# Patient Record
Sex: MALE | Race: WHITE | NOT HISPANIC OR LATINO | Employment: FULL TIME | URBAN - METROPOLITAN AREA
[De-identification: names, ages, dates, MRNs, and addresses within clinical notes are randomized per-mention and may not be internally consistent; named-entity substitution may affect disease eponyms.]

---

## 2017-02-14 ENCOUNTER — PATIENT MESSAGE (OUTPATIENT)
Dept: RESEARCH | Facility: HOSPITAL | Age: 52
End: 2017-02-14

## 2017-02-15 ENCOUNTER — TELEPHONE (OUTPATIENT)
Dept: FAMILY MEDICINE | Facility: CLINIC | Age: 52
End: 2017-02-15

## 2017-02-15 NOTE — TELEPHONE ENCOUNTER
----- Message from Chu Paulino sent at 2/15/2017  4:42 PM CST -----  Contact: [phone]   Pt would like to be worked in for an appt sooner than 03/01/17.  Please call back @ [phone].  Thanks-[facility]

## 2017-02-21 RX ORDER — ESCITALOPRAM OXALATE 10 MG/1
10 TABLET ORAL DAILY
Qty: 90 TABLET | Refills: 3 | Status: CANCELLED | OUTPATIENT
Start: 2017-02-21 | End: 2018-02-21

## 2017-02-22 ENCOUNTER — OFFICE VISIT (OUTPATIENT)
Dept: FAMILY MEDICINE | Facility: CLINIC | Age: 52
End: 2017-02-22
Payer: COMMERCIAL

## 2017-02-22 ENCOUNTER — LAB VISIT (OUTPATIENT)
Dept: LAB | Facility: HOSPITAL | Age: 52
End: 2017-02-22
Attending: FAMILY MEDICINE
Payer: COMMERCIAL

## 2017-02-22 VITALS
WEIGHT: 254.5 LBS | SYSTOLIC BLOOD PRESSURE: 113 MMHG | HEART RATE: 62 BPM | TEMPERATURE: 99 F | BODY MASS INDEX: 35.63 KG/M2 | HEIGHT: 71 IN | DIASTOLIC BLOOD PRESSURE: 71 MMHG

## 2017-02-22 DIAGNOSIS — E78.5 DYSLIPIDEMIA: Primary | ICD-10-CM

## 2017-02-22 DIAGNOSIS — Z86.010 HISTORY OF COLON POLYPS: ICD-10-CM

## 2017-02-22 DIAGNOSIS — M25.462 PAIN AND SWELLING OF KNEE, LEFT: ICD-10-CM

## 2017-02-22 DIAGNOSIS — M25.562 PAIN AND SWELLING OF KNEE, LEFT: ICD-10-CM

## 2017-02-22 DIAGNOSIS — E78.5 DYSLIPIDEMIA: ICD-10-CM

## 2017-02-22 DIAGNOSIS — F32.A DEPRESSION, UNSPECIFIED DEPRESSION TYPE: ICD-10-CM

## 2017-02-22 DIAGNOSIS — I25.10 CORONARY ARTERY DISEASE, NON-OCCLUSIVE: Chronic | ICD-10-CM

## 2017-02-22 DIAGNOSIS — F41.9 ANXIETY: ICD-10-CM

## 2017-02-22 DIAGNOSIS — Z13.9 SCREENING: ICD-10-CM

## 2017-02-22 DIAGNOSIS — G47.33 OSA ON CPAP: Chronic | ICD-10-CM

## 2017-02-22 DIAGNOSIS — I10 ESSENTIAL HYPERTENSION: ICD-10-CM

## 2017-02-22 DIAGNOSIS — E88.810 METABOLIC SYNDROME: Chronic | ICD-10-CM

## 2017-02-22 DIAGNOSIS — Z80.0 FAMILY HISTORY OF COLON CANCER: ICD-10-CM

## 2017-02-22 DIAGNOSIS — M77.8 LEFT WRIST TENDINITIS: ICD-10-CM

## 2017-02-22 LAB
ALBUMIN SERPL BCP-MCNC: 3.9 G/DL
ALP SERPL-CCNC: 81 U/L
ALT SERPL W/O P-5'-P-CCNC: 36 U/L
ANION GAP SERPL CALC-SCNC: 6 MMOL/L
AST SERPL-CCNC: 24 U/L
BILIRUB SERPL-MCNC: 0.6 MG/DL
BUN SERPL-MCNC: 21 MG/DL
CALCIUM SERPL-MCNC: 9.6 MG/DL
CHLORIDE SERPL-SCNC: 107 MMOL/L
CHOLEST/HDLC SERPL: 4.6 {RATIO}
CO2 SERPL-SCNC: 27 MMOL/L
CREAT SERPL-MCNC: 1.3 MG/DL
EST. GFR  (AFRICAN AMERICAN): >60 ML/MIN/1.73 M^2
EST. GFR  (NON AFRICAN AMERICAN): >60 ML/MIN/1.73 M^2
GLUCOSE SERPL-MCNC: 87 MG/DL
HDL/CHOLESTEROL RATIO: 21.9 %
HDLC SERPL-MCNC: 146 MG/DL
HDLC SERPL-MCNC: 32 MG/DL
LDLC SERPL CALC-MCNC: 84.2 MG/DL
NONHDLC SERPL-MCNC: 114 MG/DL
POTASSIUM SERPL-SCNC: 5.2 MMOL/L
PROT SERPL-MCNC: 7.4 G/DL
SODIUM SERPL-SCNC: 140 MMOL/L
TRIGL SERPL-MCNC: 149 MG/DL

## 2017-02-22 PROCEDURE — 3074F SYST BP LT 130 MM HG: CPT | Mod: S$GLB,,, | Performed by: FAMILY MEDICINE

## 2017-02-22 PROCEDURE — 1160F RVW MEDS BY RX/DR IN RCRD: CPT | Mod: S$GLB,,, | Performed by: FAMILY MEDICINE

## 2017-02-22 PROCEDURE — 36415 COLL VENOUS BLD VENIPUNCTURE: CPT | Mod: PO

## 2017-02-22 PROCEDURE — 80061 LIPID PANEL: CPT

## 2017-02-22 PROCEDURE — 3078F DIAST BP <80 MM HG: CPT | Mod: S$GLB,,, | Performed by: FAMILY MEDICINE

## 2017-02-22 PROCEDURE — 80053 COMPREHEN METABOLIC PANEL: CPT

## 2017-02-22 PROCEDURE — 99214 OFFICE O/P EST MOD 30 MIN: CPT | Mod: S$GLB,,, | Performed by: FAMILY MEDICINE

## 2017-02-22 PROCEDURE — 99999 PR PBB SHADOW E&M-EST. PATIENT-LVL III: CPT | Mod: PBBFAC,,, | Performed by: FAMILY MEDICINE

## 2017-02-22 RX ORDER — GABAPENTIN 300 MG/1
300 CAPSULE ORAL 2 TIMES DAILY
Qty: 180 CAPSULE | Refills: 3 | Status: SHIPPED | OUTPATIENT
Start: 2017-02-22 | End: 2018-05-10

## 2017-02-22 RX ORDER — ROSUVASTATIN CALCIUM 20 MG/1
20 TABLET, COATED ORAL DAILY
Qty: 90 TABLET | Refills: 3 | Status: SHIPPED | OUTPATIENT
Start: 2017-02-22 | End: 2018-03-20 | Stop reason: SDUPTHER

## 2017-02-22 RX ORDER — MELOXICAM 15 MG/1
15 TABLET ORAL DAILY
Qty: 30 TABLET | Refills: 0 | Status: SHIPPED | OUTPATIENT
Start: 2017-02-22 | End: 2017-05-02 | Stop reason: SDUPTHER

## 2017-02-22 RX ORDER — METOPROLOL SUCCINATE 50 MG/1
50 TABLET, EXTENDED RELEASE ORAL DAILY
Qty: 90 TABLET | Refills: 3 | Status: SHIPPED | OUTPATIENT
Start: 2017-02-22 | End: 2018-03-20 | Stop reason: SDUPTHER

## 2017-02-22 RX ORDER — AMOXICILLIN 500 MG/1
CAPSULE ORAL
Refills: 1 | COMMUNITY
Start: 2017-02-02 | End: 2017-03-07

## 2017-02-22 RX ORDER — ESCITALOPRAM OXALATE 20 MG/1
20 TABLET ORAL DAILY
Qty: 90 TABLET | Refills: 3 | Status: SHIPPED | OUTPATIENT
Start: 2017-02-22 | End: 2018-03-20 | Stop reason: SDUPTHER

## 2017-02-22 NOTE — PROGRESS NOTES
"Subjective:      Patient ID: Bebo Galindo is a 52 y.o. male.    Chief Complaint: Annual Exam    HPI   The patient presents with essential hypertension.  The patient is tolerating the medication well and is in excellent compliance.  The patient is experiencing no side effects.  Counseling was offered regarding low salt diets.  The patient has a reduced salt intake.  The patient denies chest pain, palpitations, shortness of breath, dyspnea on exertion, left or murmur neck pain, nausea, vomiting, diaphoresis, paroxysmal nocturnal dyspnea, and orthopnea.     Visit Vitals    /71    Pulse 62    Temp 98.5 °F (36.9 °C) (Oral)    Ht 5' 10.5" (1.791 m)    Wt 115.5 kg (254 lb 8.3 oz)    BMI 36 kg/m2       The patient presents with hyperlipidemia.  The patient reports tolerating the medication well and is in excellent compliance.  There have been no medication side effects.  The patient denies chest pain, neuropathy, and myalgias.  The patient has reduced fat intake and has been exercising.  Current treatment has included the medications listed in the med card.    Lab Results   Component Value Date    CHOL 133 03/18/2016    CHOL 132 02/16/2015    CHOL 156 05/09/2014       Lab Results   Component Value Date    HDL 30 (L) 03/18/2016    HDL 28 (L) 02/16/2015    HDL 33 (L) 05/09/2014       Lab Results   Component Value Date    LDLCALC 77.8 03/18/2016    LDLCALC 70.8 02/16/2015    LDLCALC 82.0 05/09/2014       Lab Results   Component Value Date    TRIG 126 03/18/2016    TRIG 166 (H) 02/16/2015    TRIG 205 (H) 05/09/2014       Lab Results   Component Value Date    CHOLHDL 22.6 03/18/2016    CHOLHDL 21.2 02/16/2015    CHOLHDL 21.2 05/09/2014     Lab Results   Component Value Date    ALT 42 03/18/2016    AST 27 03/18/2016    ALKPHOS 79 03/18/2016    BILITOT 0.8 03/18/2016     He has CLIFTON and he has had a cpap and he uses it daily.  It helps with fatigue.     He has obesity and he still is over a 35 bmi and he has " comorbidities.   He is about to go on a program with his work to lose weight.     He has a family history of colon cancer and he is not due for a scope at this time.     The patient presents with coronary artery disease.  Denies chest pain, shortness of breath, left arm or neck pain, diaphoresis, nausea, vomiting, palpitations, paroxysmal nocturnal dyspnea, orthopnea, claudication or decreased exercise tolerance.  The patient reports excellent compliance.  Current treatment has included medications that are listed in medication list.  The cannot identify any exacerbating factors.      He also has a chronic pain in the right knee from surgery and he has had gabapentin for this and he is following with Dr. Bolden who is his orthopod.  The gabapentin helps him some.     He also has had problems with a left wrist and he has had this for months.  He has not had a trauma noted.     There are no preventive care reminders to display for this patient.    Past Medical History:  Past Medical History   Diagnosis Date    Allergy     Hyperlipidemia     Hypertension     Mitral valve prolapse      Past Surgical History   Procedure Laterality Date    Sinus surgery      Colonoscopy      Vasectomy      Cardiac catheterization  10/2011     no stent    Anal fistulotomy  2015     Review of patient's allergies indicates:  No Known Allergies  Current Outpatient Prescriptions on File Prior to Visit   Medication Sig Dispense Refill    aspirin 81 MG chewable tablet Take 81 mg by mouth once.       escitalopram oxalate (LEXAPRO) 10 MG tablet Take 1 tablet (10 mg total) by mouth once daily. 30 tablet 11    gabapentin (NEURONTIN) 300 MG capsule Take 300 mg by mouth 2 (two) times daily.  5    ibuprofen (ADVIL,MOTRIN) 800 MG tablet Take 1 tablet (800 mg total) by mouth every 6 (six) hours as needed. 60 tablet 0    metoprolol succinate (TOPROL-XL) 50 MG 24 hr tablet Take 1 tablet (50 mg total) by mouth once daily. 90 tablet 3     rosuvastatin (CRESTOR) 20 MG tablet Take 1 tablet (20 mg total) by mouth once daily. 90 tablet 3    [DISCONTINUED] diphenhydrAMINE (SOMINEX) 25 mg tablet Take 50 mg by mouth nightly as needed for Insomnia.       No current facility-administered medications on file prior to visit.      Social History     Social History    Marital status:      Spouse name: N/A    Number of children: N/A    Years of education: N/A     Occupational History    Not on file.     Social History Main Topics    Smoking status: Former Smoker     Quit date: 5/9/1995    Smokeless tobacco: Never Used    Alcohol use No    Drug use: No    Sexual activity: Yes     Partners: Female     Other Topics Concern    Not on file     Social History Narrative     Family History   Problem Relation Age of Onset    Cancer Mother      Stomach    Cancer Father      Colon    Heart disease Father     Cancer Maternal Uncle      Bladder    Heart disease Maternal Uncle     Hypertension Paternal Uncle     Heart disease Paternal Uncle     Diabetes Paternal Grandmother     Heart disease Maternal Aunt     Heart disease Paternal Aunt            Review of Systems   Constitutional: Negative.  Negative for chills, diaphoresis and fever.   HENT: Negative for congestion, hearing loss, mouth sores, postnasal drip and sore throat.    Eyes: Negative for pain and visual disturbance.   Respiratory: Negative for cough, chest tightness, shortness of breath and wheezing.    Cardiovascular: Negative for chest pain.   Gastrointestinal: Negative for abdominal pain, anal bleeding, blood in stool, constipation, diarrhea, nausea and vomiting.   Genitourinary: Negative for dysuria and hematuria.   Musculoskeletal: Negative for back pain, neck pain and neck stiffness.   Skin: Negative for rash.   Neurological: Negative for dizziness and weakness.   Psychiatric/Behavioral: Positive for agitation and dysphoric mood.       Objective:     Visit Vitals    /71     "Pulse 62    Temp 98.5 °F (36.9 °C) (Oral)    Ht 5' 10.5" (1.791 m)    Wt 115.5 kg (254 lb 8.3 oz)    BMI 36 kg/m2       Physical Exam   Constitutional: He is oriented to person, place, and time. He appears well-developed and well-nourished.   HENT:   Head: Normocephalic and atraumatic.   Right Ear: External ear normal.   Left Ear: External ear normal.   Nose: Nose normal.   Mouth/Throat: Oropharynx is clear and moist. No oropharyngeal exudate.   Eyes: Conjunctivae and EOM are normal. Pupils are equal, round, and reactive to light. Right eye exhibits no discharge. Left eye exhibits no discharge. No scleral icterus.   Neck: Normal range of motion. Neck supple. No JVD present. No thyromegaly present.   Cardiovascular: Normal rate, regular rhythm, normal heart sounds and intact distal pulses.  Exam reveals no gallop and no friction rub.    No murmur heard.  Pulmonary/Chest: Effort normal and breath sounds normal. No respiratory distress. He has no wheezes. He has no rales. He exhibits no tenderness.   Abdominal: Soft. Bowel sounds are normal. He exhibits no distension and no mass. There is no tenderness. There is no rebound and no guarding.   Musculoskeletal: Normal range of motion. He exhibits no edema or tenderness.   Lymphadenopathy:     He has no cervical adenopathy.   Neurological: He is alert and oriented to person, place, and time. No cranial nerve deficit. Coordination normal.   Skin: Skin is warm and dry. He is not diaphoretic.   Psychiatric: He has a normal mood and affect.       Assessment:     1. Dyslipidemia    2. Coronary artery disease, non-occlusive    3. Pain and swelling of knee, left    4. CLIFTON on CPAP    5. Metabolic syndrome    6. Essential hypertension    7. Obesity, Class II, BMI 35-39.9, with comorbidity    8. Family history of colon cancer    9. History of colon polyps    10. Anxiety    11. Depression, unspecified depression type    12. Left wrist tendinitis    13. Screening        Plan: "   Bebo was seen today for annual exam.    Diagnoses and all orders for this visit:    Dyslipidemia  -     rosuvastatin (CRESTOR) 20 MG tablet; Take 1 tablet (20 mg total) by mouth once daily.  -     Lipid panel; Future    Coronary artery disease, non-occlusive  -     metoprolol succinate (TOPROL-XL) 50 MG 24 hr tablet; Take 1 tablet (50 mg total) by mouth once daily.  -     Lipid panel; Future    Pain and swelling of knee, left    CLIFTON on CPAP  Cont cpap.  Consider seeing Dr. Medina to review his sleep test from last year and make recommendations.  Metabolic syndrome  Keep the meds he is on   Essential hypertension  -     metoprolol succinate (TOPROL-XL) 50 MG 24 hr tablet; Take 1 tablet (50 mg total) by mouth once daily.  -     Comprehensive metabolic panel; Future    Obesity, Class II, BMI 35-39.9, with comorbidity  He is to diet and try to lose weight.  Family history of colon cancer  cscope in 2020  History of colon polyps  cscope in 2020  Anxiety  -     escitalopram oxalate (LEXAPRO) 20 MG tablet; Take 1 tablet (20 mg total) by mouth once daily.    Depression, unspecified depression type  -     escitalopram oxalate (LEXAPRO) 20 MG tablet; Take 1 tablet (20 mg total) by mouth once daily.    Left wrist tendinitis  Use mobic, ice, wrapping and rtc if not better ins everal weeks.  Other orders  -     Cancel: escitalopram oxalate (LEXAPRO) 10 MG tablet; Take 1 tablet (10 mg total) by mouth once daily.  -     gabapentin (NEURONTIN) 300 MG capsule; Take 1 capsule (300 mg total) by mouth 2 (two) times daily  -     meloxicam (MOBIC) 15 MG tablet; Take 1 tablet (15 mg total) by mouth once daily.

## 2017-02-22 NOTE — MR AVS SNAPSHOT
[facility]  [address]  [place] [address]  Phone: [phone]  Fax: [phone]                  Bebo Galindo   2017 7:00 AM   Office Visit    Description:  Male : 1965   Provider:  Alejandro Alvarado MD   Department:  [facility]           Reason for Visit     Annual Exam           Diagnoses this Visit        Comments    Dyslipidemia    -  Primary     Coronary artery disease, non-occlusive         Pain and swelling of knee, left         CLIFTON on CPAP         Metabolic syndrome         Essential hypertension         Obesity, Class II, BMI 35-39.9, with comorbidity         Family history of colon cancer         History of colon polyps         Anxiety         Depression, unspecified depression type         Left wrist tendinitis                To Do List           Future Appointments        Provider Department Dept Phone    2017 1:40 PM LABORATORY, [place] [facility] [phone]      Goals (5 Years of Data)     None      Follow-Up and Disposition     Return in about 6 months (around 2017).    Follow-up and Disposition History       These Medications        Disp Refills Start End    gabapentin (NEURONTIN) 300 MG capsule 180 capsule 3 2017     Take 1 capsule (300 mg total) by mouth 2 (two) times daily. - Oral    Pharmacy: [facility] [place] [address] Ph #: [phone]       metoprolol succinate (TOPROL-XL) 50 MG 24 hr tablet 90 tablet 3 2017     Take 1 tablet (50 mg total) by mouth once daily. - Oral    Pharmacy: [facility] [place] [address] [place] [address] Ph #: [phone]       rosuvastatin (CRESTOR) 20 MG tablet 90 tablet 3 2017     Take 1 tablet (20 mg total) by mouth once daily. - Oral    Pharmacy: [facility] [place] [address] Ph #: [phone]       escitalopram oxalate (LEXAPRO) 20 MG tablet 90 tablet 3  2/22/2017 2/22/2018    Take 1 tablet (20 mg total) by mouth once daily. - Oral    [place] [facility] [place] [address] [facility] Ph #: [phone]       meloxicam (MOBIC) 15 MG tablet 30 tablet 0 2/22/2017 2/22/2018    Take 1 tablet (15 mg total) by mouth once daily. - Oral    Ph[place]: [facility] [place] - [address] [facility] Ph #: [phone]         [facility]     [facility] On Call Nurse Care Line - 24/7 Assistance  Registered nurses in the [facility] On Call Center provide clinical advisement, health education, appointment booking, and other advisory services.  Call for this free service at [phone].             Medications           Message regarding Medications     Verify the changes and/or additions to your medication regime listed below are the same as discussed with your clinician today.  If any of these changes or additions are incorrect, please notify your healthcare provider.        START taking these NEW medications        Refills    escitalopram oxalate (LEXAPRO) 20 MG tablet 3    Sig: Take 1 tablet (20 mg total) by mouth once daily.    Class: Normal    Route: Oral    meloxicam (MOBIC) 15 MG tablet 0    Sig: Take 1 tablet (15 mg total) by mouth once daily.    Class: Normal    Route: Oral      CHANGE how you are taking these medications     Start Taking Instead of    gabapentin (NEURONTIN) 300 MG capsule gabapentin (NEURONTIN) 300 MG capsule    Dosage:  Take 1 capsule (300 mg total) by mouth 2 (two) times daily. Dosage:  Take 300 mg by mouth 2 (two) times daily.    Reason for Change:  Reorder       STOP taking these medications     diphenhydrAMINE (SOMINEX) 25 mg tablet Take 50 mg by mouth nightly as needed for Insomnia.           Verify that the below list of medications is an accurate representation of the medications you are currently taking.  If none reported, the list may be blank. If incorrect, please contact your healthcare provider. Carry  "this list with you in case of emergency.           Current Medications     aspirin 81 MG chewable tablet Take 81 mg by mouth once.     ibuprofen (ADVIL,MOTRIN) 800 MG tablet Take 1 tablet (800 mg total) by mouth every 6 (six) hours as needed.    amoxicillin (AMOXIL) 500 MG capsule     escitalopram oxalate (LEXAPRO) 20 MG tablet Take 1 tablet (20 mg total) by mouth once daily.    gabapentin (NEURONTIN) 300 MG capsule Take 1 capsule (300 mg total) by mouth 2 (two) times daily.    meloxicam (MOBIC) 15 MG tablet Take 1 tablet (15 mg total) by mouth once daily.    metoprolol succinate (TOPROL-XL) 50 MG 24 hr tablet Take 1 tablet (50 mg total) by mouth once daily.    rosuvastatin (CRESTOR) 20 MG tablet Take 1 tablet (20 mg total) by mouth once daily.           Clinical Reference Information           Your Vitals Were     BP Pulse Temp Height Weight BMI    113/71 62 98.5 °F (36.9 °C) (Oral) 5' 10.5" (1.791 m) 115.5 kg (254 lb 8.3 oz) 36 kg/m2      Blood Pressure          Most Recent Value    BP  113/71      Allergies as of 2/22/2017     No Known Allergies      Immunizations Administered on Date of Encounter - 2/22/2017     None      Orders Placed During Today's Visit     Future Labs/Procedures Expected by Expires    Comprehensive metabolic panel  2/22/2017 (Approximate) 2/21/2018    Lipid panel  2/22/2017 (Approximate) 2/21/2018      Language Assistance Services     ATTENTION: Language assistance services are available, free of charge. Please call [phone].      ATENCIÓN: Si cristina delgado, tiene a fisher disposición servicios gratuitos de asistencia lingüística. Llame al [phone].     [facility] Ý: N?u b?n nói Ti?ng Vi?t, có các d?ch v? h? tr? ngôn ng? mi?n phí dành cho b?n. G?i s? [phone].         [facility] complies with applicable Federal civil rights laws and does not discriminate on the basis of race, color, national origin, age, disability, or sex.        "

## 2017-03-07 ENCOUNTER — OFFICE VISIT (OUTPATIENT)
Dept: FAMILY MEDICINE | Facility: CLINIC | Age: 52
End: 2017-03-07
Payer: COMMERCIAL

## 2017-03-07 ENCOUNTER — LAB VISIT (OUTPATIENT)
Dept: LAB | Facility: HOSPITAL | Age: 52
End: 2017-03-07
Attending: FAMILY MEDICINE
Payer: COMMERCIAL

## 2017-03-07 VITALS
DIASTOLIC BLOOD PRESSURE: 68 MMHG | SYSTOLIC BLOOD PRESSURE: 106 MMHG | BODY MASS INDEX: 37.09 KG/M2 | WEIGHT: 259.06 LBS | HEIGHT: 70 IN | TEMPERATURE: 98 F | HEART RATE: 66 BPM

## 2017-03-07 DIAGNOSIS — K13.0 LIP MASS: ICD-10-CM

## 2017-03-07 DIAGNOSIS — J06.9 UPPER RESPIRATORY TRACT INFECTION, UNSPECIFIED TYPE: Primary | ICD-10-CM

## 2017-03-07 DIAGNOSIS — Z13.9 SCREENING: ICD-10-CM

## 2017-03-07 LAB — COMPLEXED PSA SERPL-MCNC: 0.47 NG/ML

## 2017-03-07 PROCEDURE — 3078F DIAST BP <80 MM HG: CPT | Mod: S$GLB,,,

## 2017-03-07 PROCEDURE — 36415 COLL VENOUS BLD VENIPUNCTURE: CPT | Mod: PO

## 2017-03-07 PROCEDURE — 3074F SYST BP LT 130 MM HG: CPT | Mod: S$GLB,,,

## 2017-03-07 PROCEDURE — 1160F RVW MEDS BY RX/DR IN RCRD: CPT | Mod: S$GLB,,,

## 2017-03-07 PROCEDURE — 99213 OFFICE O/P EST LOW 20 MIN: CPT | Mod: S$GLB,,,

## 2017-03-07 PROCEDURE — 84153 ASSAY OF PSA TOTAL: CPT

## 2017-03-07 PROCEDURE — 99999 PR PBB SHADOW E&M-EST. PATIENT-LVL III: CPT | Mod: PBBFAC,,,

## 2017-03-07 RX ORDER — METHYLPREDNISOLONE 4 MG/1
TABLET ORAL
Qty: 1 PACKAGE | Refills: 0 | Status: SHIPPED | OUTPATIENT
Start: 2017-03-07 | End: 2017-03-13

## 2017-03-07 RX ORDER — LEVOCETIRIZINE DIHYDROCHLORIDE 5 MG/1
5 TABLET, FILM COATED ORAL NIGHTLY
Qty: 30 TABLET | Refills: 11 | Status: SHIPPED | OUTPATIENT
Start: 2017-03-07 | End: 2018-03-20 | Stop reason: SDUPTHER

## 2017-03-07 NOTE — PROGRESS NOTES
Subjective:       Patient ID: Bebo Galindo is a 52 y.o. male.    Chief Complaint: Sinus Problem    HPI   The patient presents today with a 2 day(s) complaint of nasal congestion, PND, rhinorrhea. he says His symptoms are constant and moderate in intensity.  he voices associated sinus pressure. he denies fever, cough, SOB, wheezing. he can not identify and exacerbating or mitigating factors.    Also voices a small mass on the inside of his lower lip on the right side.  He states he can see discoloration and can feel the mass with his tongue.  He said it is been there for several months.  He voices being in ex-smoker stating that he smoked 3 packs a day at one time, but has since quit.  The patient is concerned that he may have cancer.    Review of Systems   Constitutional: Negative for activity change, appetite change, fatigue, fever and unexpected weight change.   HENT: Positive for congestion, postnasal drip, rhinorrhea and sinus pressure.    Eyes: Negative.    Respiratory: Negative for cough, chest tightness, shortness of breath and wheezing.    Cardiovascular: Negative for chest pain, palpitations and leg swelling.   Gastrointestinal: Negative for constipation, diarrhea, nausea and vomiting.   Endocrine: Negative.    Genitourinary: Negative.    Musculoskeletal: Negative.    Skin: Negative for color change.   Allergic/Immunologic: Negative.    Neurological: Positive for headaches. Negative for dizziness, weakness and light-headedness.   Hematological: Negative.    Psychiatric/Behavioral: Negative for sleep disturbance.         Objective:      Physical Exam   Constitutional: He is oriented to person, place, and time. He appears well-developed and well-nourished. No distress.   HENT:   Head: Normocephalic and atraumatic. Hair is normal.   Right Ear: Hearing, tympanic membrane, external ear and ear canal normal.   Left Ear: Hearing, tympanic membrane, external ear and ear canal normal.   Nose: Mucosal edema and  rhinorrhea present. No nose lacerations, sinus tenderness, nasal deformity, septal deviation or nasal septal hematoma. No epistaxis.  No foreign bodies. Right sinus exhibits no maxillary sinus tenderness and no frontal sinus tenderness. Left sinus exhibits no maxillary sinus tenderness and no frontal sinus tenderness.   Mouth/Throat: Uvula is midline, oropharynx is clear and moist and mucous membranes are normal.   I see no discernible mass on the inside of the lip the patient states it is palpable with his tongue.   Eyes: Conjunctivae are normal. Pupils are equal, round, and reactive to light. Right eye exhibits no discharge. Left eye exhibits no discharge.   Neck: Trachea normal, normal range of motion and phonation normal. Neck supple. No tracheal deviation present.   Cardiovascular: Normal rate, regular rhythm, normal heart sounds and intact distal pulses.  Exam reveals no gallop and no friction rub.    No murmur heard.  Pulmonary/Chest: Effort normal and breath sounds normal. No respiratory distress. He has no decreased breath sounds. He has no wheezes. He has no rhonchi. He has no rales. He exhibits no tenderness.   Musculoskeletal: Normal range of motion.   Lymphadenopathy:        Head (right side): No submental, no submandibular, no tonsillar, no preauricular, no posterior auricular and no occipital adenopathy present.        Head (left side): No submental, no submandibular, no tonsillar, no preauricular, no posterior auricular and no occipital adenopathy present.     He has no cervical adenopathy.        Right cervical: No superficial cervical, no deep cervical and no posterior cervical adenopathy present.       Left cervical: No superficial cervical, no deep cervical and no posterior cervical adenopathy present.   Neurological: He is alert and oriented to person, place, and time. He exhibits normal muscle tone. GCS eye subscore is 4. GCS verbal subscore is 5. GCS motor subscore is 6.   Skin: Skin is warm  and dry. No rash noted. He is not diaphoretic. No erythema. No pallor.   Psychiatric: He has a normal mood and affect. His speech is normal and behavior is normal. Judgment and thought content normal.       Assessment:       1. Upper respiratory tract infection, unspecified type    2. Lip mass          Plan:   Upper respiratory tract infection, unspecified type  -     methylPREDNISolone (MEDROL DOSEPACK) 4 mg tablet; use as directed  Dispense: 1 Package; Refill: 0  -     levocetirizine (XYZAL) 5 MG tablet; Take 1 tablet (5 mg total) by mouth every evening.  Dispense: 30 tablet; Refill: 11    Lip mass  -     Ambulatory referral to Dermatology          Disclaimer: This note is prepared using voice recognition software.  As such there may be errors in the dictation.  It has not been proofread.

## 2017-03-27 ENCOUNTER — TELEPHONE (OUTPATIENT)
Dept: PULMONOLOGY | Facility: CLINIC | Age: 52
End: 2017-03-27

## 2017-03-27 NOTE — TELEPHONE ENCOUNTER
----- Message from Ashley Quinn sent at 3/27/2017 10:05 AM CDT -----  Contact: pt's wife Carol [phone]  Pt has an appointment on 03/30/17. Pt's wife states he had a sleep study performed about 10 months ago. Pt may need PFT or Xray. Pt can be reached at [phone].

## 2017-03-27 NOTE — TELEPHONE ENCOUNTER
Spoke with wife about appt for 3/30. Informed no test needed prior to appt. Wife states CPAP machine not working anymore, offered sooner appt. Wife will call back after speaking to  to see if he can come earlier.

## 2017-03-30 ENCOUNTER — OFFICE VISIT (OUTPATIENT)
Dept: PULMONOLOGY | Facility: CLINIC | Age: 52
End: 2017-03-30
Payer: COMMERCIAL

## 2017-03-30 VITALS
WEIGHT: 255.31 LBS | RESPIRATION RATE: 18 BRPM | HEIGHT: 70 IN | BODY MASS INDEX: 36.55 KG/M2 | DIASTOLIC BLOOD PRESSURE: 74 MMHG | SYSTOLIC BLOOD PRESSURE: 130 MMHG | HEART RATE: 65 BPM | OXYGEN SATURATION: 96 %

## 2017-03-30 DIAGNOSIS — G47.33 OSA (OBSTRUCTIVE SLEEP APNEA): Primary | ICD-10-CM

## 2017-03-30 PROCEDURE — 99999 PR PBB SHADOW E&M-EST. PATIENT-LVL III: CPT | Mod: PBBFAC,,, | Performed by: NURSE PRACTITIONER

## 2017-03-30 PROCEDURE — 99244 OFF/OP CNSLTJ NEW/EST MOD 40: CPT | Mod: S$GLB,,, | Performed by: NURSE PRACTITIONER

## 2017-03-30 NOTE — PROGRESS NOTES
"Subjective:      Patient ID: Bebo Galindo is a 52 y.o. male.    Chief Complaint: Sleep Apnea    HPI Comments: Patient presents as a consult for sleep apnea.  Patient is on CPAP 8 cm water pressure.  Patient states he is not able to sleep without his CPAP.  Recently his CPAP stopped working.  He is not blowing out the correct amount of pressure, and a power shuts off.  He does feel as though the pressure may need to be increased.  He does continue to gain weight.  Without CPAP he snores, witnessed apneas, poor quality of sleep and daytime hypersomnolence.      /74  Pulse 65  Resp 18  Ht 5' 10" (1.778 m)  Wt 115.8 kg (255 lb 4.7 oz)  SpO2 96%  BMI 36.63 kg/m2  Body mass index is 36.63 kg/(m^2).    Review of Systems   Respiratory: Positive for snoring.    Psychiatric/Behavioral: Positive for sleep disturbance.   All other systems reviewed and are negative.    Objective:      Physical Exam   Constitutional: He is oriented to person, place, and time. He appears well-developed and well-nourished.   HENT:   Head: Normocephalic and atraumatic.   Mouth/Throat: Oropharynx is clear and moist.   Mallampati Score: III     Eyes: EOM are normal. Pupils are equal, round, and reactive to light.   Neck: Normal range of motion. Neck supple.   Neck circumference:17.5 inches      Cardiovascular: Normal rate and regular rhythm.  Exam reveals no gallop and no friction rub.    No murmur heard.  Pulmonary/Chest: Effort normal and breath sounds normal.   Abdominal: Soft. Bowel sounds are normal. He exhibits no distension. There is no tenderness.   Musculoskeletal: Normal range of motion.   Neurological: He is alert and oriented to person, place, and time.   Skin: Skin is warm and dry.   Psychiatric: He has a normal mood and affect.     Personal Diagnostic Review          Assessment:       1. CLIFTON (obstructive sleep apnea)    2. Obesity, Class II, BMI 35-39.9, with comorbidity        Outpatient Encounter Prescriptions as of " 3/30/2017   Medication Sig Dispense Refill    aspirin 81 MG chewable tablet Take 81 mg by mouth once.       escitalopram oxalate (LEXAPRO) 20 MG tablet Take 1 tablet (20 mg total) by mouth once daily. 90 tablet 3    gabapentin (NEURONTIN) 300 MG capsule Take 1 capsule (300 mg total) by mouth 2 (two) times daily. 180 capsule 3    levocetirizine (XYZAL) 5 MG tablet Take 1 tablet (5 mg total) by mouth every evening. 30 tablet 11    meloxicam (MOBIC) 15 MG tablet Take 1 tablet (15 mg total) by mouth once daily. 30 tablet 0    metoprolol succinate (TOPROL-XL) 50 MG 24 hr tablet Take 1 tablet (50 mg total) by mouth once daily. 90 tablet 3    rosuvastatin (CRESTOR) 20 MG tablet Take 1 tablet (20 mg total) by mouth once daily. 90 tablet 3     No facility-administered encounter medications on file as of 3/30/2017.      Orders Placed This Encounter   Procedures    CPAP FOR HOME USE     Order Specific Question:   Type:     Answer:   Auto CPAP     Order Specific Question:   Auto CPAP pressure setting range (cmH20):     Answer:   6-20     Order Specific Question:   Length of need (1-99 months):     Answer:   99     Order Specific Question:   Humidification:     Answer:   Heated     Order Specific Question:   Type of mask:     Answer:   FFM     Comments:   or nasal     Order Specific Question:   Headgear?     Answer:   Yes     Order Specific Question:   Tubing?     Answer:   Yes     Order Specific Question:   Humidifier chamber?     Answer:   Yes     Order Specific Question:   Chin strap?     Answer:   Yes     Order Specific Question:   Filters?     Answer:   Yes    CPAP/BIPAP SUPPLIES     Order Specific Question:   Type of mask:     Answer:   Nasal     Comments:   or FFM     Order Specific Question:   Headgear?     Answer:   Yes     Order Specific Question:   Tubing?     Answer:   Yes     Order Specific Question:   Humidifier chamber?     Answer:   Yes     Order Specific Question:   Chin strap?     Answer:   Yes      Order Specific Question:   Filters?     Answer:   Yes     Order Specific Question:   Length of need (1-99 months):     Answer:   99     Plan:      Weight loss and exercise to improve overall health.  Current CPAP broken. Replacement order.   AutoPAP 6-20 cm H2O and follow up in 8 weeks with download of data card and review of symptoms.

## 2017-05-03 RX ORDER — MELOXICAM 15 MG/1
TABLET ORAL
Qty: 30 TABLET | Refills: 1 | Status: SHIPPED | OUTPATIENT
Start: 2017-05-03 | End: 2018-05-10

## 2017-05-24 ENCOUNTER — OFFICE VISIT (OUTPATIENT)
Dept: SLEEP MEDICINE | Facility: CLINIC | Age: 52
End: 2017-05-24
Payer: COMMERCIAL

## 2017-05-24 VITALS
RESPIRATION RATE: 18 BRPM | DIASTOLIC BLOOD PRESSURE: 70 MMHG | WEIGHT: 255.94 LBS | HEIGHT: 70 IN | OXYGEN SATURATION: 95 % | HEART RATE: 66 BPM | SYSTOLIC BLOOD PRESSURE: 130 MMHG | BODY MASS INDEX: 36.64 KG/M2

## 2017-05-24 DIAGNOSIS — G47.33 OSA ON CPAP: Primary | ICD-10-CM

## 2017-05-24 PROCEDURE — 3075F SYST BP GE 130 - 139MM HG: CPT | Mod: S$GLB,,, | Performed by: NURSE PRACTITIONER

## 2017-05-24 PROCEDURE — 3078F DIAST BP <80 MM HG: CPT | Mod: S$GLB,,, | Performed by: NURSE PRACTITIONER

## 2017-05-24 PROCEDURE — 99999 PR PBB SHADOW E&M-EST. PATIENT-LVL III: CPT | Mod: PBBFAC,,, | Performed by: NURSE PRACTITIONER

## 2017-05-24 PROCEDURE — 1160F RVW MEDS BY RX/DR IN RCRD: CPT | Mod: S$GLB,,, | Performed by: NURSE PRACTITIONER

## 2017-05-24 PROCEDURE — 99213 OFFICE O/P EST LOW 20 MIN: CPT | Mod: S$GLB,,, | Performed by: NURSE PRACTITIONER

## 2017-05-24 NOTE — PROGRESS NOTES
"Subjective:      Patient ID: Bebo Galindo is a 52 y.o. male.    Chief Complaint: Sleep Apnea    Presents to office for review of AutoPAP therapy. Patient states improved symptoms with use of AutoPAP. It is taking him longer to fall asleep b/c pressure starts lower than his CPAP 8cm.  Sleeping more soundly. Waking up feeling more refreshed. Improved daytime sleepiness. Patient states he is benefiting from use of the AutoPAP.           /70   Pulse 66   Resp 18   Ht 5' 10" (1.778 m)   Wt 116.1 kg (255 lb 15.3 oz)   SpO2 95%   BMI 36.73 kg/m²   Body mass index is 36.73 kg/m².    Review of Systems   Constitutional: Negative.    HENT: Negative.    Respiratory: Negative.    Cardiovascular: Negative.    Musculoskeletal: Negative.    Gastrointestinal: Negative.    Neurological: Negative.    Psychiatric/Behavioral: Negative.      Objective:      Physical Exam   Constitutional: He is oriented to person, place, and time. He appears well-developed and well-nourished.   HENT:   Head: Normocephalic and atraumatic.   Nose: Nose normal.   Mouth/Throat: Uvula is midline and oropharynx is clear and moist.   Neck: Trachea normal and normal range of motion. Neck supple. No thyroid mass and no thyromegaly present.   Cardiovascular: Normal rate, regular rhythm and normal heart sounds.    Pulmonary/Chest: Effort normal and breath sounds normal. He has no wheezes. He has no rhonchi. He has no rales. Chest wall is not dull to percussion.   Abdominal: Soft. He exhibits no mass. There is no hepatosplenomegaly or splenomegaly. There is no tenderness.   Musculoskeletal: Normal range of motion. He exhibits no edema.   Neurological: He is alert and oriented to person, place, and time.   Skin: Skin is warm and dry.   Psychiatric: He has a normal mood and affect.     Personal Diagnostic Review  Autopap download: Patient wears on average 8 hrs and 4 minutes. Greater than 4 hrs 100 % of the time. 90% percentile pressure 9 with AHI " 1.7 events/hr          Assessment:       1. CLIFTON on CPAP        Outpatient Encounter Prescriptions as of 5/24/2017   Medication Sig Dispense Refill    aspirin 81 MG chewable tablet Take 81 mg by mouth once.       escitalopram oxalate (LEXAPRO) 20 MG tablet Take 1 tablet (20 mg total) by mouth once daily. 90 tablet 3    gabapentin (NEURONTIN) 300 MG capsule Take 1 capsule (300 mg total) by mouth 2 (two) times daily. 180 capsule 3    levocetirizine (XYZAL) 5 MG tablet Take 1 tablet (5 mg total) by mouth every evening. 30 tablet 11    meloxicam (MOBIC) 15 MG tablet Take 1 tablet (15 mg total) by mouth once daily. 30 tablet 1    metoprolol succinate (TOPROL-XL) 50 MG 24 hr tablet Take 1 tablet (50 mg total) by mouth once daily. 90 tablet 3    rosuvastatin (CRESTOR) 20 MG tablet Take 1 tablet (20 mg total) by mouth once daily. 90 tablet 3     No facility-administered encounter medications on file as of 5/24/2017.      No orders of the defined types were placed in this encounter.    Plan:      Doing well on PAP settings. Patient is compliant. Follow up in 12 months with PAP data download or call earlier if any problems.

## 2017-07-05 ENCOUNTER — OFFICE VISIT (OUTPATIENT)
Dept: CARDIOLOGY | Facility: CLINIC | Age: 52
End: 2017-07-05
Payer: COMMERCIAL

## 2017-07-05 VITALS
HEIGHT: 70 IN | SYSTOLIC BLOOD PRESSURE: 110 MMHG | DIASTOLIC BLOOD PRESSURE: 74 MMHG | WEIGHT: 255.31 LBS | BODY MASS INDEX: 36.55 KG/M2 | HEART RATE: 56 BPM

## 2017-07-05 DIAGNOSIS — I10 ESSENTIAL HYPERTENSION: ICD-10-CM

## 2017-07-05 DIAGNOSIS — I25.10 CORONARY ARTERY DISEASE, NON-OCCLUSIVE: Primary | Chronic | ICD-10-CM

## 2017-07-05 DIAGNOSIS — I20.9 ANGINA PECTORIS: ICD-10-CM

## 2017-07-05 DIAGNOSIS — G47.33 OSA ON CPAP: Chronic | ICD-10-CM

## 2017-07-05 DIAGNOSIS — Z91.89 SEDENTARY LIFESTYLE: Chronic | ICD-10-CM

## 2017-07-05 DIAGNOSIS — E88.810 METABOLIC SYNDROME: Chronic | ICD-10-CM

## 2017-07-05 PROCEDURE — 99999 PR PBB SHADOW E&M-EST. PATIENT-LVL III: CPT | Mod: PBBFAC,,, | Performed by: INTERNAL MEDICINE

## 2017-07-05 PROCEDURE — 99214 OFFICE O/P EST MOD 30 MIN: CPT | Mod: S$GLB,,, | Performed by: INTERNAL MEDICINE

## 2017-07-05 NOTE — PROGRESS NOTES
"Subjective:   Patient ID:  Bebo Galindo is a 52 y.o. male who presents for follow up of Hypertension and Coronary Artery Disease      HPI: Gentleman with nonobstructive CAD, metabolic syndrome, and CLIFTON/CPAP here for routine f/u.  He's pretty stressed out about his boat, which [place]13[place] and [place] isn't helping enough.  When he argues with his wife about this, he has chest discomfort (tightness/squeezing - "feels like someone is mashing a fist into my chest") and it's been happening more frequently lately.  He denies new TATE, palpitations, PND/orthopnea, lightheadedness and syncope.    He's not exercising at all and is up 3 pounds from his 2016 weight.      May 2016 HPI: Back for routine f/u.  Doing well.  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  Is not exercising because he never has time.     He denies chest discomfort, TATE, palpitations, PND/orthopnea, lightheadedness and syncope.  Yesterday, he played in the pool with his 16 year old son for 2 hours and did fine.    Patient Active Problem List   Diagnosis    Hypertension    Coronary artery disease, non-occlusive    Obesity, Class II, BMI 35-39.9, with comorbidity    Sedentary lifestyle    CLIFTON on CPAP    Metabolic syndrome    History of colon polyps    Family history of colon cancer    Anal discharge       Current Outpatient Prescriptions   Medication Sig    aspirin 81 MG chewable tablet Take 81 mg by mouth once daily.     escitalopram oxalate (LEXAPRO) 20 MG tablet Take 1 tablet (20 mg total) by mouth once daily.    gabapentin (NEURONTIN) 300 MG capsule Take 1 capsule (300 mg total) by mouth 2 (two) times daily.    levocetirizine (XYZAL) 5 MG tablet Take 1 tablet (5 mg total) by mouth every evening.    meloxicam (MOBIC) 15 MG tablet Take 1 tablet (15 mg total) by mouth once daily.    metoprolol succinate (TOPROL-XL) 50 MG 24 hr tablet Take 1 tablet (50 mg total) by " mouth once daily.    rosuvastatin (CRESTOR) 20 MG tablet Take 1 tablet (20 mg total) by mouth once daily.     No current facility-administered medications for this visit.        Review of Systems   Constitution: Negative.   HENT: Negative.    Eyes: Negative.    Cardiovascular: Positive for chest pain. Negative for dyspnea on exertion, near-syncope, orthopnea and palpitations.   Respiratory: Negative.  Negative for cough, hemoptysis and shortness of breath.    Endocrine: Negative.    Hematologic/Lymphatic: Negative.    Skin: Negative.    Musculoskeletal: Negative.    Gastrointestinal: Negative.    Genitourinary: Negative.    Neurological: Negative.    Psychiatric/Behavioral: Negative.      Objective:   Physical Exam   Constitutional: He is oriented to person, place, and time. He appears well-developed and well-nourished.   HENT:   Head: Normocephalic and atraumatic.   Mouth/Throat: Oropharynx is clear and moist.   Eyes: Conjunctivae and EOM are normal. No scleral icterus.   Neck: Normal range of motion. Neck supple. No JVD present.   Cardiovascular: Normal rate, regular rhythm, normal heart sounds and intact distal pulses.  Exam reveals no gallop and no friction rub.    No murmur heard.  Pulmonary/Chest: Effort normal and breath sounds normal. He has no wheezes. He has no rales.   Abdominal: Soft. Bowel sounds are normal. He exhibits no distension. There is no tenderness.   Musculoskeletal: Normal range of motion. He exhibits no edema.   Neurological: He is alert and oriented to person, place, and time.   Skin: Skin is warm and dry. No rash noted. No erythema.   Psychiatric: He has a normal mood and affect. His behavior is normal. Judgment and thought content normal.   Vitals reviewed.      Lab Results   Component Value Date    WBC 6.15 03/18/2016    HGB 15.0 03/18/2016    HCT 44.3 03/18/2016    MCV 94 03/18/2016     03/18/2016         Chemistry        Component Value Date/Time     02/22/2017 0820    K  5.2 (H) 02/22/2017 0820     02/22/2017 0820    CO2 27 02/22/2017 0820    BUN 21 (H) 02/22/2017 0820    CREATININE 1.3 02/22/2017 0820    GLU 87 02/22/2017 0820        Component Value Date/Time    CALCIUM 9.6 02/22/2017 0820    ALKPHOS 81 02/22/2017 0820    AST 24 02/22/2017 0820    ALT 36 02/22/2017 0820    BILITOT 0.6 02/22/2017 0820    ESTGFRAFRICA >60.0 02/22/2017 0820    EGFRNONAA >60.0 02/22/2017 0820            Lab Results   Component Value Date    CHOL 146 02/22/2017    CHOL 133 03/18/2016    CHOL 132 02/16/2015     Lab Results   Component Value Date    HDL 32 (L) 02/22/2017    HDL 30 (L) 03/18/2016    HDL 28 (L) 02/16/2015     Lab Results   Component Value Date    LDLCALC 84.2 02/22/2017    LDLCALC 77.8 03/18/2016    LDLCALC 70.8 02/16/2015     Lab Results   Component Value Date    TRIG 149 02/22/2017    TRIG 126 03/18/2016    TRIG 166 (H) 02/16/2015     Lab Results   Component Value Date    CHOLHDL 21.9 02/22/2017    CHOLHDL 22.6 03/18/2016    CHOLHDL 21.2 02/16/2015       Lab Results   Component Value Date    TSH 3.298 03/18/2016       No results found for: HGBA1C    Assessment:     1. Coronary artery disease, non-occlusive    2. Essential hypertension    3. CLIFTON on CPAP    4. Metabolic syndrome    5. Sedentary lifestyle    6. Obesity, Class II, BMI 35-39.9, with comorbidity        Plan:     Stress echo (no color flow).    Continue current medicines and CPAP.    Diet/exercise goals reinforced.    F/U 12 months

## 2017-07-07 ENCOUNTER — CLINICAL SUPPORT (OUTPATIENT)
Dept: CARDIOLOGY | Facility: CLINIC | Age: 52
End: 2017-07-07
Payer: COMMERCIAL

## 2017-07-07 DIAGNOSIS — I20.9 ANGINA PECTORIS: ICD-10-CM

## 2017-07-07 DIAGNOSIS — I25.10 CORONARY ARTERY DISEASE, NON-OCCLUSIVE: Chronic | ICD-10-CM

## 2017-07-07 LAB
DIASTOLIC DYSFUNCTION: NO
RETIRED EF AND QEF - SEE NOTES: 55 (ref 55–65)

## 2017-07-07 PROCEDURE — 93351 STRESS TTE COMPLETE: CPT | Mod: S$GLB,,, | Performed by: INTERNAL MEDICINE

## 2017-07-07 PROCEDURE — 93321 DOPPLER ECHO F-UP/LMTD STD: CPT | Mod: S$GLB,,, | Performed by: INTERNAL MEDICINE

## 2017-07-26 ENCOUNTER — TELEPHONE (OUTPATIENT)
Dept: CARDIOLOGY | Facility: CLINIC | Age: 52
End: 2017-07-26

## 2017-07-26 NOTE — TELEPHONE ENCOUNTER
----- Message from Hunter Padilla MD sent at 7/26/2017  1:56 PM CDT -----  Please let Mr. Galindo know that his stress echo went very well.  His exercise capacity was a little above average even, and there were no abnormalities at all on the ECG or echo portion of the test.    Very reassuring test, overall.    Thanks,  Hunter

## 2017-08-04 ENCOUNTER — TELEPHONE (OUTPATIENT)
Dept: FAMILY MEDICINE | Facility: CLINIC | Age: 52
End: 2017-08-04

## 2017-08-04 DIAGNOSIS — L74.9 SWEATING ABNORMALITY: Primary | ICD-10-CM

## 2017-08-04 NOTE — TELEPHONE ENCOUNTER
----- Message from Avelino Casillas sent at 8/4/2017  1:36 PM CDT -----  Contact: Pt/(Carol)  Pt wife states that pt pressure 146/104 with a pulse rate 68. Please give pt a call at ..[phone] (home) to let them know what needs to be done

## 2017-08-04 NOTE — TELEPHONE ENCOUNTER
bp is now 142/87.      His stress test last month was good.      When he had this episode, he had flushing, high blood pressure and sweating.  He has never been checked for a pheochromocytoma.        Orders Placed This Encounter   Procedures    VMA, urine     Standing Status:   Future     Standing Expiration Date:   8/5/2018    Metanephrines, urine     Standing Status:   Future     Standing Expiration Date:   8/5/2018

## 2017-08-07 ENCOUNTER — APPOINTMENT (OUTPATIENT)
Dept: LAB | Facility: HOSPITAL | Age: 52
End: 2017-08-07
Attending: FAMILY MEDICINE
Payer: COMMERCIAL

## 2017-08-07 PROCEDURE — 83835 ASSAY OF METANEPHRINES: CPT

## 2017-08-07 PROCEDURE — 84585 ASSAY OF URINE VMA: CPT

## 2017-08-10 ENCOUNTER — PATIENT MESSAGE (OUTPATIENT)
Dept: FAMILY MEDICINE | Facility: CLINIC | Age: 52
End: 2017-08-10

## 2017-08-16 ENCOUNTER — PATIENT MESSAGE (OUTPATIENT)
Dept: CARDIOLOGY | Facility: CLINIC | Age: 52
End: 2017-08-16

## 2017-10-05 ENCOUNTER — OFFICE VISIT (OUTPATIENT)
Dept: FAMILY MEDICINE | Facility: CLINIC | Age: 52
End: 2017-10-05
Payer: COMMERCIAL

## 2017-10-05 VITALS
WEIGHT: 264.56 LBS | SYSTOLIC BLOOD PRESSURE: 116 MMHG | BODY MASS INDEX: 37.04 KG/M2 | DIASTOLIC BLOOD PRESSURE: 71 MMHG | HEIGHT: 71 IN | HEART RATE: 57 BPM | TEMPERATURE: 98 F

## 2017-10-05 DIAGNOSIS — L23.7 ALLERGIC CONTACT DERMATITIS DUE TO PLANTS, EXCEPT FOOD: Primary | ICD-10-CM

## 2017-10-05 PROCEDURE — 96372 THER/PROPH/DIAG INJ SC/IM: CPT | Mod: S$GLB,,, | Performed by: NURSE PRACTITIONER

## 2017-10-05 PROCEDURE — 99999 PR PBB SHADOW E&M-EST. PATIENT-LVL III: CPT | Mod: PBBFAC,,, | Performed by: NURSE PRACTITIONER

## 2017-10-05 PROCEDURE — 99213 OFFICE O/P EST LOW 20 MIN: CPT | Mod: 25,S$GLB,, | Performed by: NURSE PRACTITIONER

## 2017-10-05 RX ORDER — PREDNISONE 20 MG/1
20 TABLET ORAL 2 TIMES DAILY
Qty: 10 TABLET | Refills: 0 | Status: SHIPPED | OUTPATIENT
Start: 2017-10-05 | End: 2017-10-10

## 2017-10-05 RX ORDER — METHYLPREDNISOLONE ACETATE 40 MG/ML
40 INJECTION, SUSPENSION INTRA-ARTICULAR; INTRALESIONAL; INTRAMUSCULAR; SOFT TISSUE
Status: COMPLETED | OUTPATIENT
Start: 2017-10-05 | End: 2017-10-05

## 2017-10-05 RX ORDER — GABAPENTIN 300 MG/1
600 CAPSULE ORAL
COMMUNITY
Start: 2017-02-22 | End: 2018-05-10

## 2017-10-05 RX ADMIN — METHYLPREDNISOLONE ACETATE 40 MG: 40 INJECTION, SUSPENSION INTRA-ARTICULAR; INTRALESIONAL; INTRAMUSCULAR; SOFT TISSUE at 04:10

## 2017-10-05 NOTE — PROGRESS NOTES
Subjective:       Patient ID: Bebo Galindo is a 52 y.o. male.    Chief Complaint: Rash    Rash   This is a new problem. The current episode started in the past 7 days. The problem is unchanged. Location: bilateral arms. The rash is characterized by blistering, itchiness and redness. He was exposed to plant contact. Pertinent negatives include no cough, diarrhea, eye pain, fatigue, fever, shortness of breath, sore throat or vomiting. Past treatments include antibiotic cream, anti-itch cream and antihistamine. The treatment provided no relief.       Review of Systems   Constitutional: Negative for fatigue, fever and unexpected weight change.   HENT: Negative for ear pain and sore throat.    Eyes: Negative.  Negative for pain and visual disturbance.   Respiratory: Negative for cough and shortness of breath.    Cardiovascular: Negative for chest pain and palpitations.   Gastrointestinal: Negative for abdominal pain, diarrhea, nausea and vomiting.   Genitourinary: Negative for dysuria and frequency.   Musculoskeletal: Negative for arthralgias and myalgias.   Skin: Positive for rash. Negative for color change.   Neurological: Negative for dizziness and headaches.   Psychiatric/Behavioral: Negative for sleep disturbance. The patient is not nervous/anxious.        Vitals:    10/05/17 1623   BP: 116/71   Pulse: (!) 57   Temp: 98.1 °F (36.7 °C)       Objective:     Current Outpatient Prescriptions   Medication Sig Dispense Refill    aspirin 81 MG chewable tablet Take 81 mg by mouth once daily.       escitalopram oxalate (LEXAPRO) 20 MG tablet Take 1 tablet (20 mg total) by mouth once daily. 90 tablet 3    gabapentin (NEURONTIN) 300 MG capsule Take 1 capsule (300 mg total) by mouth 2 (two) times daily. 180 capsule 3    levocetirizine (XYZAL) 5 MG tablet Take 1 tablet (5 mg total) by mouth every evening. 30 tablet 11    metoprolol succinate (TOPROL-XL) 50 MG 24 hr tablet Take 1 tablet (50 mg total) by mouth once  daily. 90 tablet 3    rosuvastatin (CRESTOR) 20 MG tablet Take 1 tablet (20 mg total) by mouth once daily. 90 tablet 3    gabapentin (NEURONTIN) 300 MG capsule Take 600 mg by mouth.      meloxicam (MOBIC) 15 MG tablet Take 1 tablet (15 mg total) by mouth once daily. 30 tablet 1    predniSONE (DELTASONE) 20 MG tablet Take 1 tablet (20 mg total) by mouth 2 (two) times daily. 10 tablet 0     Current Facility-Administered Medications   Medication Dose Route Frequency Provider Last Rate Last Dose    methylPREDNISolone acetate injection 40 mg  40 mg Intramuscular 1 time in Clinic/HOD Carlos Gillette NP           Physical Exam   Constitutional: He is oriented to person, place, and time. He appears well-developed. No distress.   HENT:   Head: Normocephalic and atraumatic.   Eyes: EOM are normal. Pupils are equal, round, and reactive to light.   Neck: Normal range of motion. Neck supple.   Cardiovascular: Normal rate and regular rhythm.    Pulmonary/Chest: Effort normal and breath sounds normal.   Musculoskeletal: Normal range of motion.   Neurological: He is alert and oriented to person, place, and time.   Skin: Skin is warm and dry. Rash (bilateral forearms with poison ivy. + erythema, + vesicular rash) noted.   Psychiatric: He has a normal mood and affect. Thought content normal.   Nursing note and vitals reviewed.      Assessment:       1. Allergic contact dermatitis due to plants, except food        Plan:   Allergic contact dermatitis due to plants, except food  -     methylPREDNISolone acetate injection 40 mg; Inject 1 mL (40 mg total) into the muscle one time.    Other orders  -     predniSONE (DELTASONE) 20 MG tablet; Take 1 tablet (20 mg total) by mouth 2 (two) times daily.  Dispense: 10 tablet; Refill: 0        Return if symptoms worsen or fail to improve.

## 2018-03-20 ENCOUNTER — PATIENT MESSAGE (OUTPATIENT)
Dept: FAMILY MEDICINE | Facility: CLINIC | Age: 53
End: 2018-03-20

## 2018-03-20 DIAGNOSIS — J06.9 UPPER RESPIRATORY TRACT INFECTION, UNSPECIFIED TYPE: ICD-10-CM

## 2018-03-20 DIAGNOSIS — E78.5 DYSLIPIDEMIA: ICD-10-CM

## 2018-03-20 DIAGNOSIS — F32.A DEPRESSION, UNSPECIFIED DEPRESSION TYPE: ICD-10-CM

## 2018-03-20 DIAGNOSIS — I10 ESSENTIAL HYPERTENSION: ICD-10-CM

## 2018-03-20 DIAGNOSIS — I25.10 CORONARY ARTERY DISEASE, NON-OCCLUSIVE: Chronic | ICD-10-CM

## 2018-03-20 DIAGNOSIS — F41.9 ANXIETY: ICD-10-CM

## 2018-03-20 RX ORDER — METOPROLOL SUCCINATE 50 MG/1
50 TABLET, EXTENDED RELEASE ORAL DAILY
Qty: 90 TABLET | Refills: 0 | Status: SHIPPED | OUTPATIENT
Start: 2018-03-20 | End: 2018-08-06 | Stop reason: SDUPTHER

## 2018-03-20 RX ORDER — LEVOCETIRIZINE DIHYDROCHLORIDE 5 MG/1
5 TABLET, FILM COATED ORAL NIGHTLY
Qty: 30 TABLET | Refills: 2 | Status: SHIPPED | OUTPATIENT
Start: 2018-03-20 | End: 2018-11-15

## 2018-03-20 RX ORDER — ESCITALOPRAM OXALATE 20 MG/1
20 TABLET ORAL DAILY
Qty: 90 TABLET | Refills: 0 | Status: SHIPPED | OUTPATIENT
Start: 2018-03-20 | End: 2018-09-19 | Stop reason: SDUPTHER

## 2018-03-20 RX ORDER — ROSUVASTATIN CALCIUM 20 MG/1
20 TABLET, COATED ORAL DAILY
Qty: 90 TABLET | Refills: 0 | Status: SHIPPED | OUTPATIENT
Start: 2018-03-20 | End: 2018-07-04 | Stop reason: SDUPTHER

## 2018-03-20 NOTE — TELEPHONE ENCOUNTER
I have refilled the patient's requested medication x 1 month.  However, the patient is due for an evaluation in the office.  Call the patient on the phone and book the patient with EITHER Carlos Gillette NP or Shakir Jain NP for a visit.    PLEASE DOCUMENT THE FACT THAT YOU HAVE CONTACTED THE PATIENT IN THE CHART FOR FUTURE REFERENCE.    Health Maintenance Due   Topic Date Due    Influenza Vaccine  08/01/2017    Lipid Panel  02/22/2018

## 2018-04-03 ENCOUNTER — TELEPHONE (OUTPATIENT)
Dept: CARDIOLOGY | Facility: CLINIC | Age: 53
End: 2018-04-03

## 2018-04-03 NOTE — TELEPHONE ENCOUNTER
,         LOV:7/5/18.The pt's wife said that her  started having sweating spells ,shaky hands and  Followed by extreme thirst.Reports that his b/p fluctuates.She reports that his first episode was on 8/4/17,she had sent a msg to you in [facility].She has spoke to  and he thought it cloud be an arrhythmia.She did schedule an appt to see you with the Nurse Practitioner on 4/10/18.Please advise.Thanks,Lamar

## 2018-04-03 NOTE — TELEPHONE ENCOUNTER
----- Message from Lydia Gallo sent at 4/3/2018  1:35 PM CDT -----  Contact: pt wife   Please call pt wife at [phone]. Patient is having some sweating spells with elevated BP. Last seen Dr Padilla on 07/05/17. Patient only wants to see Dr Padilla and is scheduled on 04/24/18 (first available)    Thank you

## 2018-04-25 ENCOUNTER — TELEPHONE (OUTPATIENT)
Dept: FAMILY MEDICINE | Facility: CLINIC | Age: 53
End: 2018-04-25

## 2018-04-25 NOTE — TELEPHONE ENCOUNTER
----- Message from Jocy Healy sent at 4/25/2018 10:42 AM CDT -----  Contact: gaurav-wife  needs callback rg r/s appt, will elaborate..[phone] (home)

## 2018-04-25 NOTE — TELEPHONE ENCOUNTER
----- Message from Stephanie Abdul sent at 4/25/2018 10:25 AM CDT -----  Contact: spouse   requesting same day access appointment for sweats and spot on lip. Please call back at [phone].        Thanks,  Stephanie Abdul

## 2018-05-01 ENCOUNTER — PATIENT OUTREACH (OUTPATIENT)
Dept: ADMINISTRATIVE | Facility: HOSPITAL | Age: 53
End: 2018-05-01

## 2018-05-01 NOTE — PROGRESS NOTES
Pre-Visit Chart audit complete and HM updated.  Alert PCP/Staff concering HM updates needed. Lipid panel and CMP order pended.

## 2018-05-02 ENCOUNTER — OFFICE VISIT (OUTPATIENT)
Dept: FAMILY MEDICINE | Facility: CLINIC | Age: 53
End: 2018-05-02
Payer: COMMERCIAL

## 2018-05-02 VITALS
SYSTOLIC BLOOD PRESSURE: 122 MMHG | HEART RATE: 78 BPM | TEMPERATURE: 98 F | HEIGHT: 71 IN | WEIGHT: 269.81 LBS | BODY MASS INDEX: 37.77 KG/M2 | DIASTOLIC BLOOD PRESSURE: 80 MMHG

## 2018-05-02 DIAGNOSIS — L98.9 SKIN LESION OF FACE: ICD-10-CM

## 2018-05-02 DIAGNOSIS — I10 ESSENTIAL HYPERTENSION: ICD-10-CM

## 2018-05-02 DIAGNOSIS — E88.810 METABOLIC SYNDROME: Chronic | ICD-10-CM

## 2018-05-02 DIAGNOSIS — R53.1 EPISODE OF GENERALIZED WEAKNESS: Primary | ICD-10-CM

## 2018-05-02 PROCEDURE — 3079F DIAST BP 80-89 MM HG: CPT | Mod: CPTII,S$GLB,, | Performed by: FAMILY MEDICINE

## 2018-05-02 PROCEDURE — 3074F SYST BP LT 130 MM HG: CPT | Mod: CPTII,S$GLB,, | Performed by: FAMILY MEDICINE

## 2018-05-02 PROCEDURE — 99999 PR PBB SHADOW E&M-EST. PATIENT-LVL III: CPT | Mod: PBBFAC,,, | Performed by: FAMILY MEDICINE

## 2018-05-02 PROCEDURE — 99214 OFFICE O/P EST MOD 30 MIN: CPT | Mod: S$GLB,,, | Performed by: FAMILY MEDICINE

## 2018-05-02 NOTE — PROGRESS NOTES
Subjective:      Patient ID: Bebo Galindo is a 53 y.o. male.    Chief Complaint: Cyst (on lip) and Shaking (sweats and shakes during daytime)    HPI  He has intermittent events or episodes of having sweats and he states that he has had shakes with this when he has this.  He states that on one episode, he was able to check the glucose about 20 minutes after it started and he had a glucose of 68.  He has a urine in the past for a workup on this but they only checked a small cup and not a 24 hour urine when they gave him the cup it was too small.   He has had 10 episodes in the last year.  They last about 20 minutes.  Eating or drinking something makes it go away.  He has not been on steroids.  He has not had chest pain or palpitations with this.    Lab Results   Component Value Date    WBC 6.15 03/18/2016    HGB 15.0 03/18/2016    HCT 44.3 03/18/2016    MCV 94 03/18/2016     03/18/2016     CMP  Sodium   Date Value Ref Range Status   02/22/2017 140 136 - 145 mmol/L Final     Potassium   Date Value Ref Range Status   02/22/2017 5.2 (H) 3.5 - 5.1 mmol/L Final     Chloride   Date Value Ref Range Status   02/22/2017 107 95 - 110 mmol/L Final     CO2   Date Value Ref Range Status   02/22/2017 27 23 - 29 mmol/L Final     Glucose   Date Value Ref Range Status   02/22/2017 87 70 - 110 mg/dL Final     BUN, Bld   Date Value Ref Range Status   02/22/2017 21 (H) 6 - 20 mg/dL Final     Creatinine   Date Value Ref Range Status   02/22/2017 1.3 0.5 - 1.4 mg/dL Final     Calcium   Date Value Ref Range Status   02/22/2017 9.6 8.7 - 10.5 mg/dL Final     Total Protein   Date Value Ref Range Status   02/22/2017 7.4 6.0 - 8.4 g/dL Final     Albumin   Date Value Ref Range Status   02/22/2017 3.9 3.5 - 5.2 g/dL Final     Total Bilirubin   Date Value Ref Range Status   02/22/2017 0.6 0.1 - 1.0 mg/dL Final     Comment:     For infants and newborns, interpretation of results should be based  on gestational age, weight and in  agreement with clinical  observations.  Premature Infant recommended reference ranges:  Up to 24 hours.............<8.0 mg/dL  Up to 48 hours............<12.0 mg/dL  3-5 days..................<15.0 mg/dL  6-29 days.................<15.0 mg/dL       Alkaline Phosphatase   Date Value Ref Range Status   02/22/2017 81 55 - 135 U/L Final     AST   Date Value Ref Range Status   02/22/2017 24 10 - 40 U/L Final     ALT   Date Value Ref Range Status   02/22/2017 36 10 - 44 U/L Final     Anion Gap   Date Value Ref Range Status   02/22/2017 6 (L) 8 - 16 mmol/L Final     eGFR if    Date Value Ref Range Status   02/22/2017 >60.0 >60 mL/min/1.73 m^2 Final     eGFR if non    Date Value Ref Range Status   02/22/2017 >60.0 >60 mL/min/1.73 m^2 Final     Comment:     Calculation used to obtain the estimated glomerular filtration  rate (eGFR) is the CKD-EPI equation. Since race is unknown   in our information system, the eGFR values for   -American and Non--American patients are given   for each creatinine result.       He also has a skin lesion on the left hip that won't heal for the last few months.    Lab Results   Component Value Date    TSH 3.298 03/18/2016         Health Maintenance Due   Topic Date Due    Lipid Panel  02/22/2018       Past Medical History:  Past Medical History:   Diagnosis Date    Allergy     Hyperlipidemia     Hypertension     Mitral valve prolapse      Past Surgical History:   Procedure Laterality Date    ANAL FISTULOTOMY  2015    CARDIAC CATHETERIZATION  10/2011    no stent    COLONOSCOPY      SINUS SURGERY      VASECTOMY       Review of patient's allergies indicates:  No Known Allergies  Current Outpatient Prescriptions on File Prior to Visit   Medication Sig Dispense Refill    aspirin 81 MG chewable tablet Take 81 mg by mouth once daily.       escitalopram oxalate (LEXAPRO) 20 MG tablet Take 1 tablet (20 mg total) by mouth once daily. 90 tablet 0  "   levocetirizine (XYZAL) 5 MG tablet Take 1 tablet (5 mg total) by mouth every evening. 30 tablet 2    metoprolol succinate (TOPROL-XL) 50 MG 24 hr tablet Take 1 tablet (50 mg total) by mouth once daily. 90 tablet 0    rosuvastatin (CRESTOR) 20 MG tablet Take 1 tablet (20 mg total) by mouth once daily. 90 tablet 0    gabapentin (NEURONTIN) 300 MG capsule Take 1 capsule (300 mg total) by mouth 2 (two) times daily. 180 capsule 3    gabapentin (NEURONTIN) 300 MG capsule Take 600 mg by mouth.      meloxicam (MOBIC) 15 MG tablet Take 1 tablet (15 mg total) by mouth once daily. 30 tablet 1     No current facility-administered medications on file prior to visit.      Social History     Social History    Marital status:      Spouse name: N/A    Number of children: N/A    Years of education: N/A     Occupational History    Not on file.     Social History Main Topics    Smoking status: Former Smoker     Quit date: 5/9/1995    Smokeless tobacco: Never Used    Alcohol use No    Drug use: No    Sexual activity: Yes     Partners: Female     Other Topics Concern    Not on file     Social History Narrative    No narrative on file     Family History   Problem Relation Age of Onset    Cancer Mother      Stomach    Cancer Father      Colon    Heart disease Father     Cancer Maternal Uncle      Bladder    Heart disease Maternal Uncle     Hypertension Paternal Uncle     Heart disease Paternal Uncle     Diabetes Paternal Grandmother     Heart disease Maternal Aunt     Heart disease Paternal Aunt            Review of Systems   Constitutional: Positive for diaphoresis. Negative for fever.   Respiratory: Negative for cough, shortness of breath and wheezing.    Cardiovascular: Negative for chest pain and palpitations.   Gastrointestinal: Negative for abdominal pain.   Genitourinary: Negative for dysuria and hematuria.       Objective:   /80   Pulse 78   Temp 97.8 °F (36.6 °C)   Ht 5' 10.5" (1.791 " m)   Wt 122.4 kg (269 lb 12.8 oz)   BMI 38.17 kg/m²     Physical Exam   Constitutional: He is oriented to person, place, and time. He appears well-developed and well-nourished.   HENT:   Head: Normocephalic and atraumatic.   Right Ear: External ear normal.   Left Ear: External ear normal.   Nose: Nose normal.   Mouth/Throat: Oropharynx is clear and moist. No oropharyngeal exudate.   Eyes: Conjunctivae and EOM are normal. Pupils are equal, round, and reactive to light. Right eye exhibits no discharge. Left eye exhibits no discharge. No scleral icterus.   Neck: Normal range of motion. Neck supple. No JVD present. No thyromegaly present.   Cardiovascular: Normal rate, regular rhythm, normal heart sounds and intact distal pulses.  Exam reveals no gallop and no friction rub.    No murmur heard.  Pulmonary/Chest: Effort normal and breath sounds normal. No respiratory distress. He has no wheezes. He has no rales. He exhibits no tenderness.   Abdominal: Soft. Bowel sounds are normal. He exhibits no distension and no mass. There is no tenderness. There is no rebound and no guarding.   Musculoskeletal: Normal range of motion. He exhibits no edema or tenderness.   Lymphadenopathy:     He has no cervical adenopathy.   Neurological: He is alert and oriented to person, place, and time. No cranial nerve deficit. Coordination normal.   Skin: Skin is warm and dry. He is not diaphoretic.        Psychiatric: He has a normal mood and affect.       Assessment:     1. Episode of generalized weakness    2. Essential hypertension    3. Metabolic syndrome    4. Skin lesion of face        Plan:   Bebo was seen today for cyst and shaking.    Diagnoses and all orders for this visit:    Episode of generalized weakness  -     VMA, urine; Future  -     Metanephrines, urine; Future  -     TSH; Future  -     CBC auto differential; Future  -     C-PEPTIDE; Future  -     Insulin, random; Future  -     PROINSULIN; Future    Essential hypertension  -      Lipid panel; Future  -     Comprehensive metabolic panel; Future    Metabolic syndrome  -     Lipid panel; Future  -     Comprehensive metabolic panel; Future    Skin lesion of face      He is going to go do Dr. Cintron.   He is to start the dash diet to lose weight and to regulate his diet more which consists of high fat and carbs.

## 2018-05-07 ENCOUNTER — LAB VISIT (OUTPATIENT)
Dept: LAB | Facility: HOSPITAL | Age: 53
End: 2018-05-07
Attending: FAMILY MEDICINE
Payer: COMMERCIAL

## 2018-05-07 DIAGNOSIS — E88.810 METABOLIC SYNDROME: Chronic | ICD-10-CM

## 2018-05-07 DIAGNOSIS — R53.1 EPISODE OF GENERALIZED WEAKNESS: ICD-10-CM

## 2018-05-07 DIAGNOSIS — I10 ESSENTIAL HYPERTENSION: ICD-10-CM

## 2018-05-07 LAB
ALBUMIN SERPL BCP-MCNC: 3.9 G/DL
ALP SERPL-CCNC: 75 U/L
ALT SERPL W/O P-5'-P-CCNC: 52 U/L
ANION GAP SERPL CALC-SCNC: 11 MMOL/L
AST SERPL-CCNC: 33 U/L
BASOPHILS # BLD AUTO: 0.04 K/UL
BASOPHILS NFR BLD: 0.7 %
BILIRUB SERPL-MCNC: 0.9 MG/DL
BUN SERPL-MCNC: 15 MG/DL
C PEPTIDE SERPL-MCNC: 4.43 NG/ML
CALCIUM SERPL-MCNC: 9.2 MG/DL
CHLORIDE SERPL-SCNC: 108 MMOL/L
CHOLEST SERPL-MCNC: 132 MG/DL
CHOLEST/HDLC SERPL: 4.9 {RATIO}
CO2 SERPL-SCNC: 22 MMOL/L
CREAT SERPL-MCNC: 1.1 MG/DL
DIFFERENTIAL METHOD: ABNORMAL
EOSINOPHIL # BLD AUTO: 0.1 K/UL
EOSINOPHIL NFR BLD: 2 %
ERYTHROCYTE [DISTWIDTH] IN BLOOD BY AUTOMATED COUNT: 12.2 %
EST. GFR  (AFRICAN AMERICAN): >60 ML/MIN/1.73 M^2
EST. GFR  (NON AFRICAN AMERICAN): >60 ML/MIN/1.73 M^2
GLUCOSE SERPL-MCNC: 92 MG/DL
HCT VFR BLD AUTO: 44.8 %
HDLC SERPL-MCNC: 27 MG/DL
HDLC SERPL: 20.5 %
HGB BLD-MCNC: 15.1 G/DL
IMM GRANULOCYTES # BLD AUTO: 0.02 K/UL
IMM GRANULOCYTES NFR BLD AUTO: 0.3 %
INSULIN COLLECTION INTERVAL: ABNORMAL
INSULIN SERPL-ACNC: 45 UU/ML
LDLC SERPL CALC-MCNC: 67 MG/DL
LYMPHOCYTES # BLD AUTO: 1.6 K/UL
LYMPHOCYTES NFR BLD: 26.5 %
MCH RBC QN AUTO: 32.8 PG
MCHC RBC AUTO-ENTMCNC: 33.7 G/DL
MCV RBC AUTO: 97 FL
MONOCYTES # BLD AUTO: 0.7 K/UL
MONOCYTES NFR BLD: 10.7 %
NEUTROPHILS # BLD AUTO: 3.7 K/UL
NEUTROPHILS NFR BLD: 59.8 %
NONHDLC SERPL-MCNC: 105 MG/DL
NRBC BLD-RTO: 0 /100 WBC
PLATELET # BLD AUTO: 214 K/UL
PMV BLD AUTO: 11.3 FL
POTASSIUM SERPL-SCNC: 4.4 MMOL/L
PROT SERPL-MCNC: 7.1 G/DL
RBC # BLD AUTO: 4.61 M/UL
SODIUM SERPL-SCNC: 141 MMOL/L
TRIGL SERPL-MCNC: 190 MG/DL
TSH SERPL DL<=0.005 MIU/L-ACNC: 2.31 UIU/ML
WBC # BLD AUTO: 6.15 K/UL

## 2018-05-07 PROCEDURE — 84585 ASSAY OF URINE VMA: CPT

## 2018-05-07 PROCEDURE — 85025 COMPLETE CBC W/AUTO DIFF WBC: CPT

## 2018-05-07 PROCEDURE — 83525 ASSAY OF INSULIN: CPT

## 2018-05-07 PROCEDURE — 80061 LIPID PANEL: CPT

## 2018-05-07 PROCEDURE — 36415 COLL VENOUS BLD VENIPUNCTURE: CPT | Mod: PO

## 2018-05-07 PROCEDURE — 83835 ASSAY OF METANEPHRINES: CPT

## 2018-05-07 PROCEDURE — 84443 ASSAY THYROID STIM HORMONE: CPT

## 2018-05-07 PROCEDURE — 80053 COMPREHEN METABOLIC PANEL: CPT

## 2018-05-07 PROCEDURE — 84206 ASSAY OF PROINSULIN: CPT

## 2018-05-07 PROCEDURE — 84681 ASSAY OF C-PEPTIDE: CPT

## 2018-05-09 ENCOUNTER — TELEPHONE (OUTPATIENT)
Dept: FAMILY MEDICINE | Facility: CLINIC | Age: 53
End: 2018-05-09

## 2018-05-09 DIAGNOSIS — E16.1 INAPPROPRIATELY HIGH SERUM INSULIN: Primary | ICD-10-CM

## 2018-05-09 NOTE — TELEPHONE ENCOUNTER
----- Message from Alejandro Alvarado MD sent at 5/9/2018  7:25 AM CDT -----  The thyroid function is normal.  The lipid panel shows good control with all but the triglycerides which is minimally increased.  I would not change any meds on this though.   The electroltyes are all OK.    The precursor to insulin is normal however the INSULIN level is high.  This could cause hypoglycemic episodes.  Based on this, I recommend a referral to an endocrinologist.

## 2018-05-09 NOTE — PROGRESS NOTES
The thyroid function is normal.  The lipid panel shows good control with all but the triglycerides which is minimally increased.  I would not change any meds on this though.   The electroltyes are all OK.    The precursor to insulin is normal however the INSULIN level is high.  This could cause hypoglycemic episodes.  Based on this, I recommend a referral to an endocrinologist.

## 2018-05-10 ENCOUNTER — OFFICE VISIT (OUTPATIENT)
Dept: ENDOCRINOLOGY | Facility: CLINIC | Age: 53
End: 2018-05-10
Payer: COMMERCIAL

## 2018-05-10 VITALS
HEIGHT: 71 IN | BODY MASS INDEX: 37.42 KG/M2 | SYSTOLIC BLOOD PRESSURE: 112 MMHG | DIASTOLIC BLOOD PRESSURE: 74 MMHG | HEART RATE: 74 BPM | WEIGHT: 267.31 LBS

## 2018-05-10 DIAGNOSIS — R68.89 ABNORMAL ENDOCRINE LABORATORY TEST FINDING: Primary | ICD-10-CM

## 2018-05-10 DIAGNOSIS — R61 DIAPHORESIS: ICD-10-CM

## 2018-05-10 DIAGNOSIS — R23.2 FLUSHING: ICD-10-CM

## 2018-05-10 DIAGNOSIS — R03.0 ELEVATED BLOOD PRESSURE READING: ICD-10-CM

## 2018-05-10 LAB — PROINSULIN SERPL-SCNC: 80 PMOL/L (ref 3.6–22)

## 2018-05-10 PROCEDURE — 3008F BODY MASS INDEX DOCD: CPT | Mod: CPTII,S$GLB,, | Performed by: INTERNAL MEDICINE

## 2018-05-10 PROCEDURE — 3074F SYST BP LT 130 MM HG: CPT | Mod: CPTII,S$GLB,, | Performed by: INTERNAL MEDICINE

## 2018-05-10 PROCEDURE — 3078F DIAST BP <80 MM HG: CPT | Mod: CPTII,S$GLB,, | Performed by: INTERNAL MEDICINE

## 2018-05-10 PROCEDURE — 99204 OFFICE O/P NEW MOD 45 MIN: CPT | Mod: S$GLB,,, | Performed by: INTERNAL MEDICINE

## 2018-05-10 PROCEDURE — 99999 PR PBB SHADOW E&M-EST. PATIENT-LVL III: CPT | Mod: PBBFAC,,, | Performed by: INTERNAL MEDICINE

## 2018-05-10 NOTE — LETTER
May 10, 2018      Alejandro Alvarado MD  [address] [facility] [address]           [place] [facility] [address] [facility] [address] [place] [address]  Phone: [phone]  Fax: [phone]          Patient: Bebo Galindo   MR Number: [id number]   YOB: 1965   Date of Visit: 5/10/2018       Dear Dr. Alejandro Alvarado:    Thank you for referring Bebo Galindo to me for evaluation. Attached you will find relevant portions of my assessment and plan of care.    If you have questions, please do not hesitate to call me. I look forward to following Bebo Galindo along with you.    Sincerely,    Dre Heredia,     Enclosure  CC:  No Recipients    If you would like to receive this communication electronically, please contact [email or web address] or [phone] to request more information on [employer] access.    For providers and/or their staff who would like to refer a patient to [facility], please contact us through our one-stop-shop provider referral line, [facility], at [phone].    If you feel you have received this communication in error or would no longer like to receive these types of communications, please e-mail [email or web address]

## 2018-05-10 NOTE — PROGRESS NOTES
CHIEF COMPLAINT: Insulin resistance  53 year old being seen as a new patient. Recently found to have an elevated insulin in May. States that he gets episodes where he gets tremors and diaphoresis. Does not recall any palpitations. His wife also checked BP and it was elevated. Has had 24 hour urine metanephrines. States that a month ago he had an episode and he drank 2 glasses of Dr. Pepper before it improved. States that he had an episode once and it was 68. States he is having an episode once a week. States can occur if has not eaten in some time. Last episode 2 weeks ago. States he has had flushing. States improves after drinking a soft drink.       PAST MEDICAL HISTORY/PAST SURGICAL HISTORY:  Reviewed in EPIC    SOCIAL HISTORY: QUit smoking 23 years ago. Now vaping. No alcohol    FAMILY HISTORY:  + DM 2. No known thyroid disease. + CAD.     MEDICATIONS/ALLERGIES: The patient's MedCard has been updated and reviewed.      ROS:   Constitutional: No recent significant weight change  Eyes: No recent visual changes  ENT: No dysphagia  Cardiovascular: Denies current anginal symptoms  Respiratory: Denies current respiratory difficulty  Gastrointestinal: Denies recent bowel disturbances  GenitoUrinary - No dysuria  Skin: No new skin rash  Neurologic: No focal neurologic complaints  Remainder ROS negative        PE:    GENERAL: Well developed, well nourished.  PSYCH:  appropriate mood and affect  EYES:  PERRL, EOM intact.  ENT: Nares patent, oropharynx clear, mucosa pink,   NECK: Supple, trachea midline, No palpable thyroid nodules.   CHEST: Resp even and unlabored, CTA bilateral.  CARDIAC: RRR, S1, S2 heard, no murmurs, rubs, S3, or S4  ABDOMEN: Soft, non-tender, non-distended;  No organomegaly  VASCULAR:  DP pulses +2/4 bilaterally, no edema  NEURO: Gait steady, CN II-VII grossly intact  SKIN: No areas of breakdown, no acanthosis nigracans.    LABS   Results for GHADA GARZA (MRN [id number]) as of 5/10/2018 11:08    Ref. Range 5/7/2018 07:30   C-Peptide Latest Ref Range: 0.78 - 5.19 ng/mL 4.43   Insulin Latest Ref Range: <25.0 uU/mL 45.0 (H)   TSH Latest Ref Range: 0.400 - 4.000 uIU/mL 2.306   Results for GHADA GARZA (MRN [id number]) as of 5/10/2018 11:08   Ref. Range 5/7/2018 07:30   Sodium Latest Ref Range: 136 - 145 mmol/L 141   Potassium Latest Ref Range: 3.5 - 5.1 mmol/L 4.4   Chloride Latest Ref Range: 95 - 110 mmol/L 108   CO2 Latest Ref Range: 23 - 29 mmol/L 22 (L)   Anion Gap Latest Ref Range: 8 - 16 mmol/L 11   BUN, Bld Latest Ref Range: 6 - 20 mg/dL 15   Creatinine Latest Ref Range: 0.5 - 1.4 mg/dL 1.1   eGFR if non African American Latest Ref Range: >60 mL/min/1.73 m^2 >60.0   eGFR if African American Latest Ref Range: >60 mL/min/1.73 m^2 >60.0   Glucose Latest Ref Range: 70 - 110 mg/dL 92   Calcium Latest Ref Range: 8.7 - 10.5 mg/dL 9.2   Alkaline Phosphatase Latest Ref Range: 55 - 135 U/L 75   Total Protein Latest Ref Range: 6.0 - 8.4 g/dL 7.1   Albumin Latest Ref Range: 3.5 - 5.2 g/dL 3.9   Total Bilirubin Latest Ref Range: 0.1 - 1.0 mg/dL 0.9   AST Latest Ref Range: 10 - 40 U/L 33   ALT Latest Ref Range: 10 - 44 U/L 52 (H)       ASSESSMENT/PLAN:  1. Insulin resistance  2. Flushing  3. Diaphoresis  4. Elevated BP (with episodes)    PLAN  Unsure if truly having hypoglycemia. Will have him check BG when having episodes. Will also having him keep a diary of symptoms with events and when events are occurring. At this point looks more like insulin resistance but would expect most post prandial hypoglycemia not after meals delayed. DIscussed eating more frequent meals with protein. Some of his symptoms sound like pheochromocytoma but had a w/u. WIll have him repeat testing after an episode to make sure.       FOLLOWUP  24 hour urine 5 HIAA, Metanephrines- start the day after a spell.   Glucometer  F/U 3 months with CMP, fasting insulin, A1c, TSH

## 2018-05-11 LAB
COLLECT DURATION TIME SPEC: 24 HR
COLLECT DURATION TIME SPEC: 24 HR
CREAT 24H UR-MRATE: 1625 MG/D (ref 800–2100)
CREAT 24H UR-MRATE: 1625 MG/D (ref 800–2100)
CREAT UR-MCNC: 67 MG/DL
CREAT UR-MCNC: 67 MG/DL
METANEPH 24H UR-MCNC: 28 UG/L
METANEPH 24H UR-MRATE: 68 UG/D (ref 62–207)
METANEPH+NORMETANEPH UR-IMP: NORMAL
METANEPH/CREAT 24H UR: 42 UG/G CRT (ref 0–300)
NORMETANEPHRINE 24H UR-MCNC: 92 UG/L
NORMETANEPHRINE 24H UR-MRATE: 223 UG/D (ref 125–510)
NORMETANEPHRINE/CREAT 24H UR: 137 UG/G CRT (ref 0–400)
SPECIMEN VOL ?TM UR: 2425 ML
SPECIMEN VOL ?TM UR: 2425 ML
VMA & CREAT 24H UR-IMP: NORMAL
VMA 24H UR-MCNC: 1 MG/L
VMA 24H UR-MRATE: 2.4 MG/D (ref 0–7)
VMA/CREAT 24H UR: 1 MG/GCR (ref 0–6)

## 2018-05-11 NOTE — PROGRESS NOTES
The VMA and metanephrines are normal however, I see that he was seen by the endocrinologist yesterday and it was recommended to recheck this after he has a spell.

## 2018-05-21 ENCOUNTER — PATIENT MESSAGE (OUTPATIENT)
Dept: FAMILY MEDICINE | Facility: CLINIC | Age: 53
End: 2018-05-21

## 2018-05-21 NOTE — LETTER
May 21, 2018              [facility]  [address] [facility] [address]  Phone: [phone]  Fax: [phone] May 21, 2018     Patient: Bebo Galindo   YOB: 1965            To Whom It May Concern:    It is my medical opinion that Bebo Galindo is on the following medications that I don't feel affect his ability to drive.     The current medication list that we have since it was last reconciled is as follows:  Current Outpatient Prescriptions on File Prior to Visit   Medication Sig Dispense Refill    aspirin 81 MG chewable tablet Take 81 mg by mouth once daily.       escitalopram oxalate (LEXAPRO) 20 MG tablet Take 1 tablet (20 mg total) by mouth once daily. 90 tablet 0    levocetirizine (XYZAL) 5 MG tablet Take 1 tablet (5 mg total) by mouth every evening. 30 tablet 2    metoprolol succinate (TOPROL-XL) 50 MG 24 hr tablet Take 1 tablet (50 mg total) by mouth once daily. 90 tablet 0    rosuvastatin (CRESTOR) 20 MG tablet Take 1 tablet (20 mg total) by mouth once daily. 90 tablet 0     No current facility-administered medications on file prior to visit.        If you have any questions or concerns, please don't hesitate to call.    Sincerely,        Alejandro Alvarado MD

## 2018-05-21 NOTE — TELEPHONE ENCOUNTER
He requested that I email this letter to his boss.  That is not HIPPA compliant.  Call him and get a fax # from him to fax his boss the letter that I will print now and ask you to fax.  I will sign it and put it in the box.

## 2018-07-02 ENCOUNTER — TELEPHONE (OUTPATIENT)
Dept: PULMONOLOGY | Facility: CLINIC | Age: 53
End: 2018-07-02

## 2018-07-02 NOTE — TELEPHONE ENCOUNTER
Attempted to contact patient ,   Daughter answer phone, offered several appointment dates and time, daughter stated she would need to call back and schedule appointment

## 2018-07-04 DIAGNOSIS — E78.5 DYSLIPIDEMIA: ICD-10-CM

## 2018-07-05 RX ORDER — ROSUVASTATIN CALCIUM 20 MG/1
TABLET, COATED ORAL
Qty: 90 TABLET | Refills: 3 | Status: SHIPPED | OUTPATIENT
Start: 2018-07-05 | End: 2019-02-12

## 2018-07-10 ENCOUNTER — OFFICE VISIT (OUTPATIENT)
Dept: SLEEP MEDICINE | Facility: CLINIC | Age: 53
End: 2018-07-10
Payer: COMMERCIAL

## 2018-07-10 VITALS
RESPIRATION RATE: 18 BRPM | BODY MASS INDEX: 37.62 KG/M2 | SYSTOLIC BLOOD PRESSURE: 128 MMHG | HEIGHT: 71 IN | DIASTOLIC BLOOD PRESSURE: 76 MMHG | OXYGEN SATURATION: 95 % | WEIGHT: 268.75 LBS | HEART RATE: 60 BPM

## 2018-07-10 DIAGNOSIS — G47.33 OSA (OBSTRUCTIVE SLEEP APNEA): Primary | ICD-10-CM

## 2018-07-10 PROCEDURE — 99213 OFFICE O/P EST LOW 20 MIN: CPT | Mod: S$GLB,,, | Performed by: NURSE PRACTITIONER

## 2018-07-10 PROCEDURE — 3008F BODY MASS INDEX DOCD: CPT | Mod: CPTII,S$GLB,, | Performed by: NURSE PRACTITIONER

## 2018-07-10 PROCEDURE — 99999 PR PBB SHADOW E&M-EST. PATIENT-LVL III: CPT | Mod: PBBFAC,,, | Performed by: NURSE PRACTITIONER

## 2018-07-10 PROCEDURE — 3074F SYST BP LT 130 MM HG: CPT | Mod: CPTII,S$GLB,, | Performed by: NURSE PRACTITIONER

## 2018-07-10 PROCEDURE — 3078F DIAST BP <80 MM HG: CPT | Mod: CPTII,S$GLB,, | Performed by: NURSE PRACTITIONER

## 2018-07-10 NOTE — PROGRESS NOTES
"Subjective:      Patient ID: Bebo Galindo is a 53 y.o. male.    Chief Complaint: Sleep Apnea    HPI    /76   Pulse 60   Resp 18   Ht 5' 10.5" (1.791 m)   Wt 121.9 kg (268 lb 11.9 oz)   SpO2 95%   BMI 38.02 kg/m²   Body mass index is 38.02 kg/m².    Review of Systems  Objective:      Physical Exam  Personal Diagnostic Review  Autopap download: Patient wears on average 7 hrs and 54 minutes. Greater than 4 hrs 100 % of the time. 90% percentile pressure 11 with AHI 0.8 events/hr        Assessment:       1. CLIFTON (obstructive sleep apnea)        Outpatient Encounter Prescriptions as of 7/10/2018   Medication Sig Dispense Refill    aspirin 81 MG chewable tablet Take 81 mg by mouth once daily.       escitalopram oxalate (LEXAPRO) 20 MG tablet Take 1 tablet (20 mg total) by mouth once daily. 90 tablet 0    metoprolol succinate (TOPROL-XL) 50 MG 24 hr tablet Take 1 tablet (50 mg total) by mouth once daily. 90 tablet 0    rosuvastatin (CRESTOR) 20 MG tablet TAKE 1 TABLET BY MOUTH ONCE DAILY 90 tablet 3    levocetirizine (XYZAL) 5 MG tablet Take 1 tablet (5 mg total) by mouth every evening. 30 tablet 2     No facility-administered encounter medications on file as of 7/10/2018.      Orders Placed This Encounter   Procedures    CPAP/BIPAP SUPPLIES     Order Specific Question:   Type of mask:     Answer:   Nasal     Order Specific Question:   Headgear?     Answer:   Yes     Order Specific Question:   Tubing?     Answer:   Yes     Order Specific Question:   Humidifier chamber?     Answer:   Yes     Order Specific Question:   Chin strap?     Answer:   Yes     Order Specific Question:   Filters?     Answer:   Yes     Order Specific Question:   Length of need (1-99 months):     Answer:   99     Plan:      Doing well on PAP settings. Patient is compliant. Follow up in 12 months with PAP data download or call earlier if any problems.    "

## 2018-08-06 DIAGNOSIS — I10 ESSENTIAL HYPERTENSION: ICD-10-CM

## 2018-08-06 DIAGNOSIS — I25.10 CORONARY ARTERY DISEASE, NON-OCCLUSIVE: Chronic | ICD-10-CM

## 2018-08-06 RX ORDER — METOPROLOL SUCCINATE 50 MG/1
50 TABLET, EXTENDED RELEASE ORAL DAILY
Qty: 90 TABLET | Refills: 11 | Status: SHIPPED | OUTPATIENT
Start: 2018-08-06 | End: 2019-08-20 | Stop reason: SDUPTHER

## 2018-09-19 DIAGNOSIS — F41.9 ANXIETY: ICD-10-CM

## 2018-09-19 DIAGNOSIS — F32.A DEPRESSION, UNSPECIFIED DEPRESSION TYPE: ICD-10-CM

## 2018-09-19 RX ORDER — ESCITALOPRAM OXALATE 20 MG/1
20 TABLET ORAL DAILY
Qty: 90 TABLET | Refills: 0 | Status: SHIPPED | OUTPATIENT
Start: 2018-09-19 | End: 2019-01-09 | Stop reason: SDUPTHER

## 2018-10-17 ENCOUNTER — PATIENT MESSAGE (OUTPATIENT)
Dept: FAMILY MEDICINE | Facility: CLINIC | Age: 53
End: 2018-10-17

## 2018-10-18 ENCOUNTER — TELEPHONE (OUTPATIENT)
Dept: FAMILY MEDICINE | Facility: CLINIC | Age: 53
End: 2018-10-18

## 2018-10-18 NOTE — TELEPHONE ENCOUNTER
Spoke with pt's wife and gave the name of cardiologist Dr Alvarado recommended, Dr Baez at [facility].

## 2018-10-18 NOTE — TELEPHONE ENCOUNTER
----- Message from Kareem Benedict sent at 10/18/2018 11:14 AM CDT -----  Contact: pt wife - gaurav   States she's calling to be referred to a Cardiologist. Had given the name before but can't remember the name states it starts with an M and can be reached at [phone]//thanks/dbw

## 2018-10-25 ENCOUNTER — TELEPHONE (OUTPATIENT)
Dept: VASCULAR SURGERY | Facility: CLINIC | Age: 53
End: 2018-10-25

## 2018-10-25 NOTE — TELEPHONE ENCOUNTER
Spoke with pt's wife. Will call for appt with Dr. Baez (cardiology) for evaluation of chest pain.

## 2018-10-25 NOTE — TELEPHONE ENCOUNTER
----- Message from Francisca Quesada sent at 10/25/2018  2:14 PM CDT -----  Contact: Patient's wife, Carol  Patient's wife would like to speak with someone to see if the doctor would see him for his current condition.  Patient was also told that he has blockage in the  maker and he is looking to get seen by a different physician.  He saw Dr. Gonzalez in the past at the [facility].  Call Back#[phone]  Thanks

## 2018-11-05 ENCOUNTER — TELEPHONE (OUTPATIENT)
Dept: URGENT CARE | Facility: CLINIC | Age: 53
End: 2018-11-05

## 2018-11-05 NOTE — TELEPHONE ENCOUNTER
----- Message from Alejandra Chow sent at 11/5/2018  1:12 PM CST -----  Contact: Patricia/pt wife   Call caller regarding some questions that she has before scheduling a appt. Caller states that pt will be a new pt.    [phone] (home)

## 2018-11-05 NOTE — TELEPHONE ENCOUNTER
11/05 rec'd request to call wife about an appt. I called and spoke with wife. Moving to our area and needs to est with new cardiologist. Had previous cardiologist , Dr Marcus with [facility] and cath done at the now closed [facility]. Was told then has  maker.  Saw Dr Padilla and was told to come back in one year.  Since we have no previous records will start anew. Dr Alvarado ref to Dr Baez. I made an appt to see Kerri Darden NP so we could see him sooner and possibley eval for test. Wife agreed. She will look in her records to see is she finds copy of the heart cath to bring to the appt. soraya

## 2018-11-15 ENCOUNTER — OFFICE VISIT (OUTPATIENT)
Dept: CARDIOLOGY | Facility: CLINIC | Age: 53
End: 2018-11-15
Payer: COMMERCIAL

## 2018-11-15 VITALS
SYSTOLIC BLOOD PRESSURE: 124 MMHG | WEIGHT: 272.38 LBS | HEART RATE: 71 BPM | HEIGHT: 70 IN | BODY MASS INDEX: 38.99 KG/M2 | DIASTOLIC BLOOD PRESSURE: 72 MMHG

## 2018-11-15 DIAGNOSIS — I10 ESSENTIAL HYPERTENSION: ICD-10-CM

## 2018-11-15 DIAGNOSIS — G47.33 OSA ON CPAP: Chronic | ICD-10-CM

## 2018-11-15 DIAGNOSIS — R06.09 DOE (DYSPNEA ON EXERTION): ICD-10-CM

## 2018-11-15 DIAGNOSIS — E88.810 METABOLIC SYNDROME: Chronic | ICD-10-CM

## 2018-11-15 DIAGNOSIS — R07.9 ACUTE CHEST PAIN: Primary | ICD-10-CM

## 2018-11-15 DIAGNOSIS — E78.5 DYSLIPIDEMIA: ICD-10-CM

## 2018-11-15 DIAGNOSIS — I25.10 NON-OCCLUSIVE CORONARY ARTERY DISEASE: ICD-10-CM

## 2018-11-15 PROCEDURE — 3078F DIAST BP <80 MM HG: CPT | Mod: CPTII,S$GLB,, | Performed by: NURSE PRACTITIONER

## 2018-11-15 PROCEDURE — 99213 OFFICE O/P EST LOW 20 MIN: CPT | Mod: S$GLB,,, | Performed by: NURSE PRACTITIONER

## 2018-11-15 PROCEDURE — 3074F SYST BP LT 130 MM HG: CPT | Mod: CPTII,S$GLB,, | Performed by: NURSE PRACTITIONER

## 2018-11-15 PROCEDURE — 3008F BODY MASS INDEX DOCD: CPT | Mod: CPTII,S$GLB,, | Performed by: NURSE PRACTITIONER

## 2018-11-15 PROCEDURE — 99999 PR PBB SHADOW E&M-EST. PATIENT-LVL III: CPT | Mod: PBBFAC,,, | Performed by: NURSE PRACTITIONER

## 2018-11-15 PROCEDURE — 93000 ELECTROCARDIOGRAM COMPLETE: CPT | Mod: S$GLB,,, | Performed by: INTERNAL MEDICINE

## 2018-11-15 NOTE — PROGRESS NOTES
Subjective:    Patient ID:  Bebo Galindo is a 53 y.o. male who presents for follow-up of Establish Care      HPI: Mr. Bebo Galindo presents to the clinic for follow up of non-obstructive CAD, HTN, HLP. he stated that he has TATE walking fairly short distances and he notices a heaviness in his chest at the same time. It does not occur at rest; the chest discomfort is in the center of the chest; it it non-radiating; it is not associated with N/V/diaphoresis. According to note by Dr. Padilla, non-obstructive CAD with a history of an angiogram at [facility] in 2011 that showed a 60% lesion in the proximal RI and a 30% ostial left main lesion by IVUS.   CLIFTON: On CPAP; followed by Pulmonary on [facility]. Compliant.    Medications: he is not missing any doses.  Sodium: he does add salt to foods,  he is not reading labels for sodium content. Eating at [employer]  Dietary Fats: fried shrimp platter; sausage; eggs; honey on carrots  Exercise: he does not  Tobacco: former smoker; vaping  Alcohol: no alcohol use     Weight: 123.5 kg (272 lb 6.1 oz) he states that his daily weights has been stable  Wt Readings from Last 3 Encounters:   11/15/18 123.5 kg (272 lb 6.1 oz)   07/10/18 121.9 kg (268 lb 11.9 oz)   05/10/18 121.2 kg (267 lb 4.9 oz)     BP log: None.      Review of Systems   Constitution: Positive for malaise/fatigue. Negative for chills, decreased appetite, fever, night sweats, weight gain and weight loss.   HENT: Negative for congestion.    Cardiovascular: Positive for chest pain, dyspnea on exertion and leg swelling. Negative for claudication, cyanosis, irregular heartbeat, near-syncope, orthopnea, palpitations, paroxysmal nocturnal dyspnea and syncope.   Respiratory: Negative for cough, hemoptysis, shortness of breath, sputum production and wheezing.    Hematologic/Lymphatic: Negative for adenopathy and bleeding problem. Does not bruise/bleed easily.   Skin: Negative for color change  and nail changes.   Gastrointestinal: Negative for bloating, abdominal pain, change in bowel habit, heartburn, hematochezia, melena, nausea and vomiting.   Genitourinary: Negative for hematuria.   Neurological: Negative for dizziness and light-headedness.   Psychiatric/Behavioral: Negative for altered mental status.       Objective:   Physical Exam   Constitutional: He is oriented to person, place, and time. He appears well-developed and well-nourished. No distress.   HENT:   Head: Normocephalic and atraumatic.   Eyes: Conjunctivae are normal. No scleral icterus.   Neck: Neck supple. No JVD present. No tracheal deviation present. No thyromegaly present.   Cardiovascular: Normal rate, regular rhythm, normal heart sounds and intact distal pulses. Exam reveals no gallop and no friction rub.   No murmur heard.  Pulmonary/Chest: Effort normal and breath sounds normal. No respiratory distress. He has no wheezes. He has no rales. He exhibits no tenderness.   Abdominal: Soft. Bowel sounds are normal. He exhibits no distension and no mass. There is no tenderness. There is no rebound and no guarding.   Musculoskeletal: Normal range of motion. He exhibits no edema.   Lymphadenopathy:     He has no cervical adenopathy.   Neurological: He is alert and oriented to person, place, and time.   Skin: Skin is warm and dry. No rash noted. He is not diaphoretic. No erythema. No pallor.   Pink   Psychiatric: He has a normal mood and affect.   Results for KAYLA, RYAN GUSTAVO (MRN [id number]) as of 11/15/2018 16:19   Ref. Range 5/7/2018 07:30   Sodium Latest Ref Range: 136 - 145 mmol/L 141   Potassium Latest Ref Range: 3.5 - 5.1 mmol/L 4.4   Chloride Latest Ref Range: 95 - 110 mmol/L 108   CO2 Latest Ref Range: 23 - 29 mmol/L 22 (L)   Anion Gap Latest Ref Range: 8 - 16 mmol/L 11   BUN, Bld Latest Ref Range: 6 - 20 mg/dL 15   Creatinine Latest Ref Range: 0.5 - 1.4 mg/dL 1.1   eGFR if non African American Latest Ref Range: >60 mL/min/1.73 m^2  >60.0   eGFR if African American Latest Ref Range: >60 mL/min/1.73 m^2 >60.0   Glucose Latest Ref Range: 70 - 110 mg/dL 92   Calcium Latest Ref Range: 8.7 - 10.5 mg/dL 9.2   Alkaline Phosphatase Latest Ref Range: 55 - 135 U/L 75   Total Protein Latest Ref Range: 6.0 - 8.4 g/dL 7.1   Albumin Latest Ref Range: 3.5 - 5.2 g/dL 3.9   Total Bilirubin Latest Ref Range: 0.1 - 1.0 mg/dL 0.9   AST Latest Ref Range: 10 - 40 U/L 33   ALT Latest Ref Range: 10 - 44 U/L 52 (H)   Triglycerides Latest Ref Range: 30 - 150 mg/dL 190 (H)   Cholesterol Latest Ref Range: 120 - 199 mg/dL 132   HDL Latest Ref Range: 40 - 75 mg/dL 27 (L)   HDL/Chol Ratio Latest Ref Range: 20.0 - 50.0 % 20.5   LDL Cholesterol Latest Ref Range: 63.0 - 159.0 mg/dL 67.0   Non-HDL Cholesterol Latest Units: mg/dL 105   Total Cholesterol/HDL Ratio Latest Ref Range: 2.0 - 5.0  4.9   C-Peptide Latest Ref Range: 0.78 - 5.19 ng/mL 4.43   Insulin Latest Ref Range: <25.0 uU/mL 45.0 (H)   Insulin Collection Interval Unknown fasting   Proinsulin Latest Ref Range: 3.6 - 22 pmol/L 80 (H)   TSH Latest Ref Range: 0.400 - 4.000 uIU/mL 2.306     EX2D 7/7/17: CONCLUSIONS     1 - Normal right ventricular systolic function .     2 - Normal left ventricular systolic function (EF 55-60%).     3 - Normal left ventricular diastolic function.   No evidence of stress induced myocardial ischemia.      Assessment:      1. Acute chest pain    2. TATE (dyspnea on exertion)    3. Non-occlusive coronary artery disease    4. Metabolic syndrome    5. Essential hypertension    6. Dyslipidemia    7. CLIFTON on CPAP        Plan:     Acute chest pain  -     Exercise stress echo; Future  -     IN OFFICE EKG 12-LEAD (to Muse)    TATE (dyspnea on exertion)  -     Exercise stress echo; Future  -     IN OFFICE EKG 12-LEAD (to Muse)    Non-occlusive coronary artery disease  -     IN OFFICE EKG 12-LEAD (to Muse)    Metabolic syndrome    Essential hypertension    Dyslipidemia    CLIFTON on CPAP       EKG today: NSR  @ 71 BPM.  EX2D; if negative, he will begin exercising-walking initially and getting pedometer magi on his phone.  Reviewed FLP: increased triglycerides/low HDL; recommend reducing sugars and starches-increasing non-starchy vegetables to at least two meals/day.  Tobacco cessation counseling. He is vaping and he is convinced that it has no nicotine and that it is safe. Provided information about consequences of vaping.  Follow up after testing complete.

## 2018-11-29 ENCOUNTER — CLINICAL SUPPORT (OUTPATIENT)
Dept: CARDIOLOGY | Facility: CLINIC | Age: 53
End: 2018-11-29
Attending: NURSE PRACTITIONER
Payer: COMMERCIAL

## 2018-11-29 DIAGNOSIS — R06.09 DOE (DYSPNEA ON EXERTION): ICD-10-CM

## 2018-11-29 DIAGNOSIS — R07.9 ACUTE CHEST PAIN: ICD-10-CM

## 2018-11-29 LAB
DIASTOLIC DYSFUNCTION: NO
ESTIMATED PA SYSTOLIC PRESSURE: 20.14
RETIRED EF AND QEF - SEE NOTES: 55 (ref 55–65)

## 2018-11-29 PROCEDURE — 93351 STRESS TTE COMPLETE: CPT | Mod: S$GLB,,, | Performed by: INTERNAL MEDICINE

## 2018-11-29 PROCEDURE — 93321 DOPPLER ECHO F-UP/LMTD STD: CPT | Mod: S$GLB,,, | Performed by: INTERNAL MEDICINE

## 2018-12-05 ENCOUNTER — OFFICE VISIT (OUTPATIENT)
Dept: CARDIOLOGY | Facility: CLINIC | Age: 53
End: 2018-12-05
Payer: COMMERCIAL

## 2018-12-05 VITALS
SYSTOLIC BLOOD PRESSURE: 127 MMHG | BODY MASS INDEX: 39.67 KG/M2 | DIASTOLIC BLOOD PRESSURE: 72 MMHG | HEART RATE: 59 BPM | WEIGHT: 277.13 LBS | HEIGHT: 70 IN

## 2018-12-05 DIAGNOSIS — R23.0 BLUE TOES: ICD-10-CM

## 2018-12-05 DIAGNOSIS — I10 ESSENTIAL HYPERTENSION: ICD-10-CM

## 2018-12-05 DIAGNOSIS — E88.810 METABOLIC SYNDROME: Chronic | ICD-10-CM

## 2018-12-05 DIAGNOSIS — G47.33 OSA ON CPAP: Chronic | ICD-10-CM

## 2018-12-05 DIAGNOSIS — I25.10 NON-OCCLUSIVE CORONARY ARTERY DISEASE: ICD-10-CM

## 2018-12-05 DIAGNOSIS — R07.9 CHEST PAIN, UNSPECIFIED TYPE: Primary | ICD-10-CM

## 2018-12-05 PROCEDURE — 99213 OFFICE O/P EST LOW 20 MIN: CPT | Mod: S$GLB,,, | Performed by: NURSE PRACTITIONER

## 2018-12-05 PROCEDURE — 3078F DIAST BP <80 MM HG: CPT | Mod: CPTII,S$GLB,, | Performed by: NURSE PRACTITIONER

## 2018-12-05 PROCEDURE — 99999 PR PBB SHADOW E&M-EST. PATIENT-LVL III: CPT | Mod: PBBFAC,,, | Performed by: NURSE PRACTITIONER

## 2018-12-05 PROCEDURE — 3008F BODY MASS INDEX DOCD: CPT | Mod: CPTII,S$GLB,, | Performed by: NURSE PRACTITIONER

## 2018-12-05 PROCEDURE — 3074F SYST BP LT 130 MM HG: CPT | Mod: CPTII,S$GLB,, | Performed by: NURSE PRACTITIONER

## 2018-12-05 NOTE — PROGRESS NOTES
Subjective:    Patient ID:  Bebo Galindo is a 53 y.o. male who presents for follow-up of Results      HPI: Mr. Bebo Galindo presents to the clinic for follow up of test results. He had an EX2D that was negative for ischemia; however, he took metoprolol, and did not reach his target heart rate (he reached 82%). He continues to complain of chest discomfort to the right of the sternum that is tight and feels like pressure. It occurred while driving and resolved on its own within 7 minutes. He denied radiation of pain or SOB or N/V/Diaphoresis. He denied lightheadedness, dizziness, or near syncope.  He has a history of non-obstructive CAD with 30% LM by IVUS in 2011; HTN, and HLP.  He also reports that his great toes get very purple; there is no pain, but the feet are cold.    History of an angiogram at [facility] in 2011 that showed a 60% lesion in the proximal RI and a 30% ostial left main lesion by IVUS.   CLIFTON: On CPAP; followed by Pulmonary on [facility]. Compliant.    Medications: he is not missing any doses. Taking fish oil.  Sodium: he does add salt to foods,  he is not reading labels for sodium content. Eating at [employer]  Dietary Fats: fried shrimp platter; sausage; eggs; honey on carrots  Dietary sugar; reduced sugar a little. Was drinking water; return to drinking Dr. Pepper.  Exercise: he does not  Tobacco: former smoker; vaping  Alcohol: no alcohol use     Weight: 125.7 kg (277 lb 1.9 oz) he states that his daily weights has been stable Body mass index is 39.76 kg/m².  Wt Readings from Last 3 Encounters:   12/05/18 125.7 kg (277 lb 1.9 oz)   11/15/18 123.5 kg (272 lb 6.1 oz)   07/10/18 121.9 kg (268 lb 11.9 oz)     BP log: None.      Review of Systems   Constitution: Positive for malaise/fatigue. Negative for chills, decreased appetite, fever, night sweats, weight gain and weight loss.   HENT: Negative for congestion.    Cardiovascular: Positive for chest pain and leg  swelling. Negative for claudication, cyanosis, irregular heartbeat, near-syncope, orthopnea, palpitations, paroxysmal nocturnal dyspnea and syncope.   Respiratory: Negative for cough, hemoptysis, shortness of breath, sputum production and wheezing.    Hematologic/Lymphatic: Negative for adenopathy and bleeding problem. Does not bruise/bleed easily.   Skin: Positive for color change (His great toes get very dark purple; feet cold.). Negative for nail changes.   Gastrointestinal: Negative for bloating, abdominal pain, change in bowel habit, heartburn, hematochezia, melena, nausea and vomiting.   Genitourinary: Negative for hematuria.   Neurological: Negative for dizziness and light-headedness.   Psychiatric/Behavioral: Negative for altered mental status.       Objective:   Physical Exam   Constitutional: He is oriented to person, place, and time. He appears well-developed and well-nourished. No distress.   HENT:   Head: Normocephalic and atraumatic.   Eyes: Conjunctivae are normal. No scleral icterus.   Neck: Neck supple. No JVD present. No tracheal deviation present. No thyromegaly present.   Cardiovascular: Normal rate, regular rhythm, normal heart sounds and intact distal pulses. Exam reveals no gallop and no friction rub.   No murmur heard.  Pulses:       Carotid pulses are 2+ on the right side, and 2+ on the left side.       Radial pulses are 2+ on the right side, and 2+ on the left side.        Dorsalis pedis pulses are 2+ on the right side, and 2+ on the left side.        Posterior tibial pulses are 2+ on the right side, and 2+ on the left side.   Toes and feet are cold to touch even after wearing socks and being in boots. Skin dry and pink. Brisk capillary refill.   Pulmonary/Chest: Effort normal and breath sounds normal. No respiratory distress. He has no wheezes. He has no rales. He exhibits no tenderness.   Abdominal: Soft. Bowel sounds are normal. He exhibits no distension and no mass. There is no  tenderness. There is no rebound and no guarding.   Musculoskeletal: Normal range of motion. He exhibits no edema.   Lymphadenopathy:     He has no cervical adenopathy.   Neurological: He is alert and oriented to person, place, and time.   Skin: Skin is warm and dry. No rash noted. He is not diaphoretic. No erythema. No pallor.   Pink   Psychiatric: He has a normal mood and affect.   Results for GHADA GARZA (MRN [id number]) as of 11/15/2018 16:19   Ref. Range 5/7/2018 07:30   Sodium Latest Ref Range: 136 - 145 mmol/L 141   Potassium Latest Ref Range: 3.5 - 5.1 mmol/L 4.4   Chloride Latest Ref Range: 95 - 110 mmol/L 108   CO2 Latest Ref Range: 23 - 29 mmol/L 22 (L)   Anion Gap Latest Ref Range: 8 - 16 mmol/L 11   BUN, Bld Latest Ref Range: 6 - 20 mg/dL 15   Creatinine Latest Ref Range: 0.5 - 1.4 mg/dL 1.1   eGFR if non African American Latest Ref Range: >60 mL/min/1.73 m^2 >60.0   eGFR if African American Latest Ref Range: >60 mL/min/1.73 m^2 >60.0   Glucose Latest Ref Range: 70 - 110 mg/dL 92   Calcium Latest Ref Range: 8.7 - 10.5 mg/dL 9.2   Alkaline Phosphatase Latest Ref Range: 55 - 135 U/L 75   Total Protein Latest Ref Range: 6.0 - 8.4 g/dL 7.1   Albumin Latest Ref Range: 3.5 - 5.2 g/dL 3.9   Total Bilirubin Latest Ref Range: 0.1 - 1.0 mg/dL 0.9   AST Latest Ref Range: 10 - 40 U/L 33   ALT Latest Ref Range: 10 - 44 U/L 52 (H)   Triglycerides Latest Ref Range: 30 - 150 mg/dL 190 (H)   Cholesterol Latest Ref Range: 120 - 199 mg/dL 132   HDL Latest Ref Range: 40 - 75 mg/dL 27 (L)   HDL/Chol Ratio Latest Ref Range: 20.0 - 50.0 % 20.5   LDL Cholesterol Latest Ref Range: 63.0 - 159.0 mg/dL 67.0   Non-HDL Cholesterol Latest Units: mg/dL 105   Total Cholesterol/HDL Ratio Latest Ref Range: 2.0 - 5.0  4.9   C-Peptide Latest Ref Range: 0.78 - 5.19 ng/mL 4.43   Insulin Latest Ref Range: <25.0 uU/mL 45.0 (H)   Insulin Collection Interval Unknown fasting   Proinsulin Latest Ref Range: 3.6 - 22 pmol/L 80 (H)   TSH Latest  Ref Range: 0.400 - 4.000 uIU/mL 2.306     EX2D 11/29/18: CONCLUSIONS     1 - Concentric remodeling.     2 - No wall motion abnormalities.     3 - Normal left ventricular systolic function (EF 55-60%).     4 - Normal left ventricular diastolic function.     5 - Normal right ventricular systolic function .     6 - The estimated PA systolic pressure is 20 mmHg.   No evidence of stress induced myocardial ischemia. Sensitivity is impaired due to failure to reach target heart rate.    EX2D 7/7/17: CONCLUSIONS     1 - Normal right ventricular systolic function .     2 - Normal left ventricular systolic function (EF 55-60%).     3 - Normal left ventricular diastolic function.   No evidence of stress induced myocardial ischemia.      Assessment:      1. Chest pain, unspecified type    2. Non-occlusive coronary artery disease    3. Blue toes    4. Essential hypertension    5. CLIFTON on CPAP    6. Metabolic syndrome    7. BMI 39.0-39.9,adult        Plan:     Chest pain, unspecified type    Non-occlusive coronary artery disease    Blue toes  -     Cardiology Lab CHARLES Resting, Lower Extremities; Future  -     Cardiology Lab US Lower Extremity Arteries Bilateral; Future    Essential hypertension    CLIFTON on CPAP    Metabolic syndrome    BMI 39.0-39.9,adult       Reviewed results of stress test with Mr. Galindo.  He does not want to change his diet. Encouraged increasing non-starchy vegetables.  Tobacco cessation counseling. He is vaping and he is convinced that it has no nicotine and that it is safe. Provided information about consequences of vaping.  Spoke with Dr. Baez and we will get lexiscan. Phone review.  Follow up in 3 months or call sooner for any problems.

## 2018-12-06 ENCOUNTER — CLINICAL SUPPORT (OUTPATIENT)
Dept: CARDIOLOGY | Facility: CLINIC | Age: 53
End: 2018-12-06
Attending: NURSE PRACTITIONER
Payer: COMMERCIAL

## 2018-12-06 DIAGNOSIS — R23.0 BLUE TOES: ICD-10-CM

## 2018-12-06 LAB — VASCULAR ANKLE BRACHIAL INDEX (ABI) RIGHT: 1.27 (ref 0.9–1.2)

## 2018-12-06 PROCEDURE — 93922 UPR/L XTREMITY ART 2 LEVELS: CPT | Mod: S$GLB,,, | Performed by: INTERNAL MEDICINE

## 2018-12-06 PROCEDURE — 93925 LOWER EXTREMITY STUDY: CPT | Mod: S$GLB,,, | Performed by: INTERNAL MEDICINE

## 2018-12-12 ENCOUNTER — CLINICAL SUPPORT (OUTPATIENT)
Dept: CARDIOLOGY | Facility: CLINIC | Age: 53
End: 2018-12-12
Attending: NURSE PRACTITIONER
Payer: COMMERCIAL

## 2018-12-12 DIAGNOSIS — G47.33 OSA ON CPAP: Chronic | ICD-10-CM

## 2018-12-12 DIAGNOSIS — R07.9 CHEST PAIN, UNSPECIFIED TYPE: ICD-10-CM

## 2018-12-12 DIAGNOSIS — I10 ESSENTIAL HYPERTENSION: ICD-10-CM

## 2018-12-12 DIAGNOSIS — I25.10 NON-OCCLUSIVE CORONARY ARTERY DISEASE: ICD-10-CM

## 2018-12-12 DIAGNOSIS — E88.810 METABOLIC SYNDROME: Chronic | ICD-10-CM

## 2019-01-09 DIAGNOSIS — F41.9 ANXIETY: ICD-10-CM

## 2019-01-09 DIAGNOSIS — F32.A DEPRESSION, UNSPECIFIED DEPRESSION TYPE: ICD-10-CM

## 2019-01-09 RX ORDER — ESCITALOPRAM OXALATE 20 MG/1
20 TABLET ORAL DAILY
Qty: 90 TABLET | Refills: 0 | Status: SHIPPED | OUTPATIENT
Start: 2019-01-09 | End: 2019-04-16 | Stop reason: SDUPTHER

## 2019-02-12 ENCOUNTER — OFFICE VISIT (OUTPATIENT)
Dept: FAMILY MEDICINE | Facility: CLINIC | Age: 54
End: 2019-02-12
Payer: COMMERCIAL

## 2019-02-12 VITALS
SYSTOLIC BLOOD PRESSURE: 122 MMHG | DIASTOLIC BLOOD PRESSURE: 75 MMHG | WEIGHT: 273.63 LBS | TEMPERATURE: 98 F | HEART RATE: 60 BPM | HEIGHT: 70 IN | BODY MASS INDEX: 39.17 KG/M2

## 2019-02-12 DIAGNOSIS — H65.91 FLUID LEVEL BEHIND TYMPANIC MEMBRANE OF RIGHT EAR: ICD-10-CM

## 2019-02-12 DIAGNOSIS — J32.9 SINUSITIS, UNSPECIFIED CHRONICITY, UNSPECIFIED LOCATION: Primary | ICD-10-CM

## 2019-02-12 DIAGNOSIS — R29.898 TMJ CLICK: ICD-10-CM

## 2019-02-12 PROCEDURE — 3078F DIAST BP <80 MM HG: CPT | Mod: CPTII,S$GLB,, | Performed by: NURSE PRACTITIONER

## 2019-02-12 PROCEDURE — 3008F BODY MASS INDEX DOCD: CPT | Mod: CPTII,S$GLB,, | Performed by: NURSE PRACTITIONER

## 2019-02-12 PROCEDURE — 3008F PR BODY MASS INDEX (BMI) DOCUMENTED: ICD-10-PCS | Mod: CPTII,S$GLB,, | Performed by: NURSE PRACTITIONER

## 2019-02-12 PROCEDURE — 96372 PR INJECTION,THERAP/PROPH/DIAG2ST, IM OR SUBCUT: ICD-10-PCS | Mod: S$GLB,,, | Performed by: NURSE PRACTITIONER

## 2019-02-12 PROCEDURE — 3078F PR MOST RECENT DIASTOLIC BLOOD PRESSURE < 80 MM HG: ICD-10-PCS | Mod: CPTII,S$GLB,, | Performed by: NURSE PRACTITIONER

## 2019-02-12 PROCEDURE — 96372 THER/PROPH/DIAG INJ SC/IM: CPT | Mod: S$GLB,,, | Performed by: NURSE PRACTITIONER

## 2019-02-12 PROCEDURE — 99999 PR PBB SHADOW E&M-EST. PATIENT-LVL IV: CPT | Mod: PBBFAC,,, | Performed by: NURSE PRACTITIONER

## 2019-02-12 PROCEDURE — 99213 OFFICE O/P EST LOW 20 MIN: CPT | Mod: 25,S$GLB,, | Performed by: NURSE PRACTITIONER

## 2019-02-12 PROCEDURE — 99999 PR PBB SHADOW E&M-EST. PATIENT-LVL IV: ICD-10-PCS | Mod: PBBFAC,,, | Performed by: NURSE PRACTITIONER

## 2019-02-12 PROCEDURE — 3074F SYST BP LT 130 MM HG: CPT | Mod: CPTII,S$GLB,, | Performed by: NURSE PRACTITIONER

## 2019-02-12 PROCEDURE — 99213 PR OFFICE/OUTPT VISIT, EST, LEVL III, 20-29 MIN: ICD-10-PCS | Mod: 25,S$GLB,, | Performed by: NURSE PRACTITIONER

## 2019-02-12 PROCEDURE — 3074F PR MOST RECENT SYSTOLIC BLOOD PRESSURE < 130 MM HG: ICD-10-PCS | Mod: CPTII,S$GLB,, | Performed by: NURSE PRACTITIONER

## 2019-02-12 RX ORDER — AZELASTINE 1 MG/ML
1 SPRAY, METERED NASAL 2 TIMES DAILY
Qty: 30 ML | Refills: 0 | Status: SHIPPED | OUTPATIENT
Start: 2019-02-12 | End: 2019-09-09

## 2019-02-12 RX ORDER — DEXAMETHASONE SODIUM PHOSPHATE 4 MG/ML
8 INJECTION, SOLUTION INTRA-ARTICULAR; INTRALESIONAL; INTRAMUSCULAR; INTRAVENOUS; SOFT TISSUE
Status: COMPLETED | OUTPATIENT
Start: 2019-02-12 | End: 2019-02-12

## 2019-02-12 RX ORDER — DOXYCYCLINE HYCLATE 100 MG
100 TABLET ORAL 2 TIMES DAILY
Qty: 20 TABLET | Refills: 0 | Status: SHIPPED | OUTPATIENT
Start: 2019-02-12 | End: 2019-02-22

## 2019-02-12 RX ADMIN — DEXAMETHASONE SODIUM PHOSPHATE 8 MG: 4 INJECTION, SOLUTION INTRA-ARTICULAR; INTRALESIONAL; INTRAMUSCULAR; INTRAVENOUS; SOFT TISSUE at 07:02

## 2019-04-16 DIAGNOSIS — F41.9 ANXIETY: ICD-10-CM

## 2019-04-16 DIAGNOSIS — F32.A DEPRESSION, UNSPECIFIED DEPRESSION TYPE: ICD-10-CM

## 2019-04-16 RX ORDER — ESCITALOPRAM OXALATE 20 MG/1
TABLET ORAL
Qty: 90 TABLET | Refills: 0 | Status: SHIPPED | OUTPATIENT
Start: 2019-04-16 | End: 2019-07-31 | Stop reason: SDUPTHER

## 2019-06-03 ENCOUNTER — OFFICE VISIT (OUTPATIENT)
Dept: FAMILY MEDICINE | Facility: CLINIC | Age: 54
End: 2019-06-03
Payer: COMMERCIAL

## 2019-06-03 VITALS
WEIGHT: 271 LBS | DIASTOLIC BLOOD PRESSURE: 73 MMHG | BODY MASS INDEX: 38.8 KG/M2 | TEMPERATURE: 100 F | HEART RATE: 94 BPM | HEIGHT: 70 IN | SYSTOLIC BLOOD PRESSURE: 113 MMHG

## 2019-06-03 DIAGNOSIS — N39.0 URINARY TRACT INFECTION WITHOUT HEMATURIA, SITE UNSPECIFIED: ICD-10-CM

## 2019-06-03 DIAGNOSIS — R30.0 DYSURIA: ICD-10-CM

## 2019-06-03 LAB
BACTERIA #/AREA URNS HPF: ABNORMAL /HPF
BILIRUB UR QL STRIP: NEGATIVE
CLARITY UR: CLEAR
COLOR UR: YELLOW
GLUCOSE UR QL STRIP: NEGATIVE
HGB UR QL STRIP: ABNORMAL
KETONES UR QL STRIP: NEGATIVE
LEUKOCYTE ESTERASE UR QL STRIP: ABNORMAL
MICROSCOPIC COMMENT: ABNORMAL
NITRITE UR QL STRIP: NEGATIVE
PH UR STRIP: 6 [PH] (ref 5–8)
PROT UR QL STRIP: ABNORMAL
RBC #/AREA URNS HPF: 4 /HPF (ref 0–4)
SP GR UR STRIP: 1.01 (ref 1–1.03)
SQUAMOUS #/AREA URNS HPF: 1 /HPF
URN SPEC COLLECT METH UR: ABNORMAL
WBC #/AREA URNS HPF: >100 /HPF (ref 0–5)

## 2019-06-03 PROCEDURE — 87186 SC STD MICRODIL/AGAR DIL: CPT

## 2019-06-03 PROCEDURE — 3078F PR MOST RECENT DIASTOLIC BLOOD PRESSURE < 80 MM HG: ICD-10-PCS | Mod: CPTII,S$GLB,, | Performed by: NURSE PRACTITIONER

## 2019-06-03 PROCEDURE — 96372 THER/PROPH/DIAG INJ SC/IM: CPT | Mod: S$GLB,,, | Performed by: NURSE PRACTITIONER

## 2019-06-03 PROCEDURE — 99999 PR PBB SHADOW E&M-EST. PATIENT-LVL III: CPT | Mod: PBBFAC,,, | Performed by: NURSE PRACTITIONER

## 2019-06-03 PROCEDURE — 87086 URINE CULTURE/COLONY COUNT: CPT

## 2019-06-03 PROCEDURE — 3078F DIAST BP <80 MM HG: CPT | Mod: CPTII,S$GLB,, | Performed by: NURSE PRACTITIONER

## 2019-06-03 PROCEDURE — 3008F PR BODY MASS INDEX (BMI) DOCUMENTED: ICD-10-PCS | Mod: CPTII,S$GLB,, | Performed by: NURSE PRACTITIONER

## 2019-06-03 PROCEDURE — 87077 CULTURE AEROBIC IDENTIFY: CPT

## 2019-06-03 PROCEDURE — 3074F SYST BP LT 130 MM HG: CPT | Mod: CPTII,S$GLB,, | Performed by: NURSE PRACTITIONER

## 2019-06-03 PROCEDURE — 99213 OFFICE O/P EST LOW 20 MIN: CPT | Mod: 25,S$GLB,, | Performed by: NURSE PRACTITIONER

## 2019-06-03 PROCEDURE — 3008F BODY MASS INDEX DOCD: CPT | Mod: CPTII,S$GLB,, | Performed by: NURSE PRACTITIONER

## 2019-06-03 PROCEDURE — 81000 URINALYSIS NONAUTO W/SCOPE: CPT | Mod: PO

## 2019-06-03 PROCEDURE — 3074F PR MOST RECENT SYSTOLIC BLOOD PRESSURE < 130 MM HG: ICD-10-PCS | Mod: CPTII,S$GLB,, | Performed by: NURSE PRACTITIONER

## 2019-06-03 PROCEDURE — 99999 PR PBB SHADOW E&M-EST. PATIENT-LVL III: ICD-10-PCS | Mod: PBBFAC,,, | Performed by: NURSE PRACTITIONER

## 2019-06-03 PROCEDURE — 96372 PR INJECTION,THERAP/PROPH/DIAG2ST, IM OR SUBCUT: ICD-10-PCS | Mod: S$GLB,,, | Performed by: NURSE PRACTITIONER

## 2019-06-03 PROCEDURE — 99213 PR OFFICE/OUTPT VISIT, EST, LEVL III, 20-29 MIN: ICD-10-PCS | Mod: 25,S$GLB,, | Performed by: NURSE PRACTITIONER

## 2019-06-03 PROCEDURE — 87088 URINE BACTERIA CULTURE: CPT

## 2019-06-03 RX ORDER — ROSUVASTATIN CALCIUM 20 MG/1
20 TABLET, COATED ORAL DAILY
Refills: 3 | COMMUNITY
Start: 2019-04-16 | End: 2019-07-31 | Stop reason: SDUPTHER

## 2019-06-03 RX ORDER — CEFTRIAXONE 1 G/1
1 INJECTION, POWDER, FOR SOLUTION INTRAMUSCULAR; INTRAVENOUS
Status: DISCONTINUED | OUTPATIENT
Start: 2019-06-03 | End: 2020-01-01

## 2019-06-03 RX ORDER — CIPROFLOXACIN 500 MG/1
500 TABLET ORAL EVERY 12 HOURS
Qty: 14 TABLET | Refills: 0 | Status: SHIPPED | OUTPATIENT
Start: 2019-06-03 | End: 2019-08-19

## 2019-06-03 RX ADMIN — CEFTRIAXONE 1 G: 1 INJECTION, POWDER, FOR SOLUTION INTRAMUSCULAR; INTRAVENOUS at 10:06

## 2019-06-03 NOTE — PROGRESS NOTES
Subjective:       Patient ID: Bebo Galindo is a 54 y.o. male.    Chief Complaint: Dysuria; Fever; and Headache    Fever    This is a new problem. The current episode started yesterday. The problem occurs constantly. The problem has been unchanged. The maximum temperature noted was 102 to 102.9 F. The temperature was taken using an axillary reading. Associated symptoms include headaches. Pertinent negatives include no abdominal pain, chest pain, coughing, diarrhea, ear pain, nausea, rash, sore throat, urinary pain or vomiting. He has tried acetaminophen for the symptoms. The treatment provided mild relief.       Review of Systems   Constitutional: Positive for fatigue and fever. Negative for unexpected weight change.   HENT: Negative for ear pain and sore throat.    Eyes: Negative.  Negative for pain and visual disturbance.   Respiratory: Negative for cough and shortness of breath.    Cardiovascular: Negative for chest pain and palpitations.   Gastrointestinal: Negative for abdominal pain, diarrhea, nausea and vomiting.   Genitourinary: Negative for dysuria and frequency.   Musculoskeletal: Positive for back pain. Negative for arthralgias and myalgias.   Skin: Negative for color change and rash.   Neurological: Positive for headaches. Negative for dizziness.   Psychiatric/Behavioral: Negative for sleep disturbance. The patient is not nervous/anxious.        Vitals:    06/03/19 0842   BP: 113/73   Pulse: 94   Temp: 99.5 °F (37.5 °C)       Objective:     Current Outpatient Medications   Medication Sig Dispense Refill    aspirin 81 MG chewable tablet Take 81 mg by mouth once daily.       escitalopram oxalate (LEXAPRO) 20 MG tablet TAKE 1 TABLET BY MOUTH EVERY DAY 90 tablet 0    metoprolol succinate (TOPROL-XL) 50 MG 24 hr tablet Take 1 tablet (50 mg total) by mouth once daily. 90 tablet 11    rosuvastatin (CRESTOR) 20 MG tablet Take 20 mg by mouth once daily.  3    azelastine (ASTELIN) 137 mcg (0.1 %) nasal  spray 1 spray (137 mcg total) by Nasal route 2 (two) times daily. for 7 days 30 mL 0    ciprofloxacin HCl (CIPRO) 500 MG tablet Take 1 tablet (500 mg total) by mouth every 12 (twelve) hours. 14 tablet 0     Current Facility-Administered Medications   Medication Dose Route Frequency Provider Last Rate Last Dose    cefTRIAXone injection 1 g  1 g Intramuscular Q24H Carlos Gillette, SALVATORE           Physical Exam   Constitutional: He is oriented to person, place, and time. He appears well-developed. No distress.   HENT:   Head: Normocephalic and atraumatic.   Eyes: Pupils are equal, round, and reactive to light. EOM are normal.   Neck: Normal range of motion. Neck supple.   Cardiovascular: Normal rate and regular rhythm.   Pulmonary/Chest: Effort normal and breath sounds normal.   Abdominal: Soft. Normal appearance and bowel sounds are normal. There is no tenderness. There is no CVA tenderness.   Musculoskeletal: Normal range of motion.   Neurological: He is alert and oriented to person, place, and time.   Skin: Skin is warm and dry. No rash noted.   Psychiatric: He has a normal mood and affect. Thought content normal.   Nursing note and vitals reviewed.      Lab Results   Component Value Date    COLORU Yellow 06/03/2019    APPEARANCEUA Clear 06/03/2019    GLUCUA Negative 06/03/2019    SPECGRAV 1.015 06/03/2019    PHUR 6.0 06/03/2019    NITRITE Negative 06/03/2019    KETONESU Negative 06/03/2019    BILIRUBINUA Negative 06/03/2019    OCCULTUA 1+ (A) 06/03/2019    LEUKOCYTESUR 3+ (A) 06/03/2019       Lab Results   Component Value Date    RBCUA 4 06/03/2019    WBCUA >100 (H) 06/03/2019    BACTERIA Occasional 06/03/2019    SQUAMEPITHEL 1 06/03/2019    MICROCMT SEE COMMENT 06/03/2019       Assessment:       1. Urinary tract infection without hematuria, site unspecified    2. Dysuria        Plan:   Urinary tract infection without hematuria, site unspecified  -     cefTRIAXone injection 1 g    Dysuria  -     Urinalysis  -      CULTURE, URINE    Other orders  -     Urinalysis Microscopic  -     ciprofloxacin HCl (CIPRO) 500 MG tablet; Take 1 tablet (500 mg total) by mouth every 12 (twelve) hours.  Dispense: 14 tablet; Refill: 0        Follow up if symptoms worsen or fail to improve.    There are no Patient Instructions on file for this visit.

## 2019-06-03 NOTE — LETTER
Yas 3, 2019      [facility]  [address] [place] [address]  Phone: [phone]  Fax: [phone]       Patient: Bebo Galindo   YOB: 1965  Date of Visit: 06/03/2019    To Whom It May Concern:    Pal Galindo  was at [facility] on 06/03/2019. He may return to work/school on 6/4/19 or 6/5/19 with no restrictions. If you have any questions or concerns, or if I can be of further assistance, please do not hesitate to contact me.    Sincerely,    Carlos Gillette NP

## 2019-06-08 LAB — BACTERIA UR CULT: NORMAL

## 2019-06-11 DIAGNOSIS — N39.0 URINARY TRACT INFECTION WITHOUT HEMATURIA, SITE UNSPECIFIED: Primary | ICD-10-CM

## 2019-07-31 DIAGNOSIS — E78.5 DYSLIPIDEMIA: ICD-10-CM

## 2019-07-31 DIAGNOSIS — F32.A DEPRESSION, UNSPECIFIED DEPRESSION TYPE: ICD-10-CM

## 2019-07-31 DIAGNOSIS — F41.9 ANXIETY: ICD-10-CM

## 2019-07-31 RX ORDER — ROSUVASTATIN CALCIUM 20 MG/1
TABLET, COATED ORAL
Qty: 90 TABLET | Refills: 0 | Status: SHIPPED | OUTPATIENT
Start: 2019-07-31 | End: 2019-11-02 | Stop reason: SDUPTHER

## 2019-07-31 RX ORDER — ESCITALOPRAM OXALATE 20 MG/1
TABLET ORAL
Qty: 90 TABLET | Refills: 0 | Status: SHIPPED | OUTPATIENT
Start: 2019-07-31 | End: 2019-11-02 | Stop reason: SDUPTHER

## 2019-07-31 NOTE — TELEPHONE ENCOUNTER
Book a lipid/CMP on the patient.      Lab Results   Component Value Date    CHOL 132 05/07/2018    CHOL 146 02/22/2017    CHOL 133 03/18/2016     Lab Results   Component Value Date    HDL 27 (L) 05/07/2018    HDL 32 (L) 02/22/2017    HDL 30 (L) 03/18/2016     Lab Results   Component Value Date    LDLCALC 67.0 05/07/2018    LDLCALC 84.2 02/22/2017    LDLCALC 77.8 03/18/2016     Lab Results   Component Value Date    TRIG 190 (H) 05/07/2018    TRIG 149 02/22/2017    TRIG 126 03/18/2016     Lab Results   Component Value Date    CHOLHDL 20.5 05/07/2018    CHOLHDL 21.9 02/22/2017    CHOLHDL 22.6 03/18/2016     Lab Results   Component Value Date    ALT 52 (H) 05/07/2018    AST 33 05/07/2018    ALKPHOS 75 05/07/2018    BILITOT 0.9 05/07/2018     Office Visit on 06/03/2019   Component Date Value Ref Range Status    Specimen UA 06/03/2019 Urine, Clean Catch   Final    Color, UA 06/03/2019 Yellow  Yellow, Straw, Le[place] Final    Appearance, UA 06/03/2019 Clear  Clear Final    pH, UA 06/03/2019 6.0  5.0 - 8.0 Final    Specific Gravity, UA 06/03/2019 1.015  1.005 - 1.030 Final    Protein, UA 06/03/2019 Trace* Negative Final    Comment: Recommend a 24 hour urine protein or a urine   protein/creatinine ratio if globulin induced proteinuria is  clinically suspected.      Glucose, UA 06/03/2019 Negative  Negative Final    Ketones, UA 06/03/2019 Negative  Negative Final    Bilirubin (UA) 06/03/2019 Negative  Negative Final    Occult Blood UA 06/03/2019 1+* Negative Final    Nitrite, UA 06/03/2019 Negative  Negative Final    Leukocytes, UA 06/03/2019 3+* Negative Final    Urine Culture, Routine 06/03/2019    Final                    Value:ACINETOBACTER BAUMANNII/HAEMOLYTICUS  > 100,000 cfu/ml   ([facility])       RBC, UA 06/03/2019 4  0 - 4 /hpf Final    WBC, UA 06/03/2019 >100* 0 - 5 /hpf Final    Bacteria 06/03/2019 Occasional  None-Occ /hpf Final    Squam Epithel, UA 06/03/2019 1  /hpf Final    Microscopic  Comment 06/03/2019 SEE COMMENT   Final    Comment: Other formed elements not mentioned in the report are not   present in the microscopic examination.

## 2019-08-14 ENCOUNTER — PATIENT MESSAGE (OUTPATIENT)
Dept: FAMILY MEDICINE | Facility: CLINIC | Age: 54
End: 2019-08-14

## 2019-08-19 ENCOUNTER — LAB VISIT (OUTPATIENT)
Dept: LAB | Facility: HOSPITAL | Age: 54
End: 2019-08-19
Attending: FAMILY MEDICINE
Payer: COMMERCIAL

## 2019-08-19 ENCOUNTER — OFFICE VISIT (OUTPATIENT)
Dept: FAMILY MEDICINE | Facility: CLINIC | Age: 54
End: 2019-08-19
Payer: COMMERCIAL

## 2019-08-19 VITALS
SYSTOLIC BLOOD PRESSURE: 117 MMHG | WEIGHT: 278 LBS | TEMPERATURE: 98 F | BODY MASS INDEX: 39.8 KG/M2 | HEIGHT: 70 IN | HEART RATE: 67 BPM | DIASTOLIC BLOOD PRESSURE: 80 MMHG

## 2019-08-19 DIAGNOSIS — N52.01 ERECTILE DYSFUNCTION DUE TO ARTERIAL INSUFFICIENCY: ICD-10-CM

## 2019-08-19 DIAGNOSIS — R68.82 DECREASED LIBIDO: ICD-10-CM

## 2019-08-19 DIAGNOSIS — N41.0 ACUTE PROSTATITIS: ICD-10-CM

## 2019-08-19 DIAGNOSIS — N41.0 ACUTE PROSTATITIS: Primary | ICD-10-CM

## 2019-08-19 LAB
BACTERIA #/AREA URNS HPF: NORMAL /HPF
BILIRUB UR QL STRIP: NEGATIVE
CLARITY UR: CLEAR
COLOR UR: YELLOW
COMPLEXED PSA SERPL-MCNC: 0.82 NG/ML (ref 0–4)
GLUCOSE UR QL STRIP: NEGATIVE
HGB UR QL STRIP: ABNORMAL
HYALINE CASTS #/AREA URNS LPF: 0 /LPF
KETONES UR QL STRIP: NEGATIVE
LEUKOCYTE ESTERASE UR QL STRIP: NEGATIVE
MICROSCOPIC COMMENT: NORMAL
NITRITE UR QL STRIP: NEGATIVE
PH UR STRIP: 6 [PH] (ref 5–8)
PROT UR QL STRIP: ABNORMAL
RBC #/AREA URNS HPF: 1 /HPF (ref 0–4)
SP GR UR STRIP: 1.03 (ref 1–1.03)
SQUAMOUS #/AREA URNS HPF: 6 /HPF
TESTOST SERPL-MCNC: 225 NG/DL (ref 304–1227)
URN SPEC COLLECT METH UR: ABNORMAL
WBC #/AREA URNS HPF: 1 /HPF (ref 0–5)

## 2019-08-19 PROCEDURE — 3008F PR BODY MASS INDEX (BMI) DOCUMENTED: ICD-10-PCS | Mod: CPTII,S$GLB,, | Performed by: FAMILY MEDICINE

## 2019-08-19 PROCEDURE — 3074F PR MOST RECENT SYSTOLIC BLOOD PRESSURE < 130 MM HG: ICD-10-PCS | Mod: CPTII,S$GLB,, | Performed by: FAMILY MEDICINE

## 2019-08-19 PROCEDURE — 84153 ASSAY OF PSA TOTAL: CPT

## 2019-08-19 PROCEDURE — 99999 PR PBB SHADOW E&M-EST. PATIENT-LVL III: ICD-10-PCS | Mod: PBBFAC,,, | Performed by: FAMILY MEDICINE

## 2019-08-19 PROCEDURE — 84403 ASSAY OF TOTAL TESTOSTERONE: CPT

## 2019-08-19 PROCEDURE — 3079F DIAST BP 80-89 MM HG: CPT | Mod: CPTII,S$GLB,, | Performed by: FAMILY MEDICINE

## 2019-08-19 PROCEDURE — 3079F PR MOST RECENT DIASTOLIC BLOOD PRESSURE 80-89 MM HG: ICD-10-PCS | Mod: CPTII,S$GLB,, | Performed by: FAMILY MEDICINE

## 2019-08-19 PROCEDURE — 81000 URINALYSIS NONAUTO W/SCOPE: CPT | Mod: PO

## 2019-08-19 PROCEDURE — 3008F BODY MASS INDEX DOCD: CPT | Mod: CPTII,S$GLB,, | Performed by: FAMILY MEDICINE

## 2019-08-19 PROCEDURE — 36415 COLL VENOUS BLD VENIPUNCTURE: CPT | Mod: PO

## 2019-08-19 PROCEDURE — 99214 PR OFFICE/OUTPT VISIT, EST, LEVL IV, 30-39 MIN: ICD-10-PCS | Mod: S$GLB,,, | Performed by: FAMILY MEDICINE

## 2019-08-19 PROCEDURE — 99214 OFFICE O/P EST MOD 30 MIN: CPT | Mod: S$GLB,,, | Performed by: FAMILY MEDICINE

## 2019-08-19 PROCEDURE — 99999 PR PBB SHADOW E&M-EST. PATIENT-LVL III: CPT | Mod: PBBFAC,,, | Performed by: FAMILY MEDICINE

## 2019-08-19 PROCEDURE — 3074F SYST BP LT 130 MM HG: CPT | Mod: CPTII,S$GLB,, | Performed by: FAMILY MEDICINE

## 2019-08-19 RX ORDER — SULFAMETHOXAZOLE AND TRIMETHOPRIM 800; 160 MG/1; MG/1
1 TABLET ORAL 2 TIMES DAILY
Qty: 42 TABLET | Refills: 0 | Status: SHIPPED | OUTPATIENT
Start: 2019-08-19 | End: 2019-09-09

## 2019-08-19 RX ORDER — SILDENAFIL CITRATE 20 MG/1
TABLET ORAL
Qty: 40 TABLET | Refills: 11 | Status: SHIPPED | OUTPATIENT
Start: 2019-08-19 | End: 2020-01-01

## 2019-08-20 ENCOUNTER — TELEPHONE (OUTPATIENT)
Dept: FAMILY MEDICINE | Facility: CLINIC | Age: 54
End: 2019-08-20

## 2019-08-20 DIAGNOSIS — I25.10 CORONARY ARTERY DISEASE, NON-OCCLUSIVE: Chronic | ICD-10-CM

## 2019-08-20 DIAGNOSIS — I10 ESSENTIAL HYPERTENSION: ICD-10-CM

## 2019-08-20 DIAGNOSIS — R80.9 PROTEINURIA, UNSPECIFIED TYPE: Primary | ICD-10-CM

## 2019-08-20 RX ORDER — METOPROLOL SUCCINATE 50 MG/1
50 TABLET, EXTENDED RELEASE ORAL DAILY
Qty: 90 TABLET | Refills: 11 | Status: SHIPPED | OUTPATIENT
Start: 2019-08-20 | End: 2020-01-01

## 2019-08-20 NOTE — TELEPHONE ENCOUNTER
----- Message from Alejandro Alvarado MD sent at 8/19/2019  9:25 PM CDT -----  The current value for the PSA is considered normal.  Please recheck this in 1 year.     I have reviewed the testosterone and it is abnormal.  I recommend that we use 200 mg of depo testosterone by injection every 1 month(s) and recheck a testosterone level in 3 months.  Let me know if this is agreeable.

## 2019-08-20 NOTE — TELEPHONE ENCOUNTER
----- Message from Myriam Lane sent at 8/20/2019 11:29 AM CDT -----  Contact: Wife- Mrs Eden- [phone]  Would like to consult with the nurse, Wife thinks she had a missed call from the Office,  Please call back at [phone], Thanks sj

## 2019-08-20 NOTE — TELEPHONE ENCOUNTER
Spoke with pt wife. States she will call us back to let us know when he wants to schedule his testosterone injection.

## 2019-08-20 NOTE — PROGRESS NOTES
The urine has a high protein content.  This is not normal and could be from a kidney issue.  Because of this, I would like for you to have a referral to Dr. Rush here at [facility] in [place].  She sees patients in the cardiology clinic down the street.

## 2019-08-20 NOTE — TELEPHONE ENCOUNTER
----- Message from Alejandro Alvarado MD sent at 8/19/2019  9:26 PM CDT -----  The urine has a high protein content.  This is not normal and could be from a kidney issue.  Because of this, I would like for you to have a referral to Dr. Rush here at [facility] in [place].  She sees patients in the cardiology clinic down the street.

## 2019-08-20 NOTE — PROGRESS NOTES
The current value for the PSA is considered normal.  Please recheck this in 1 year.     I have reviewed the testosterone and it is abnormal.  I recommend that we use 200 mg of depo testosterone by injection every 1 month(s) and recheck a testosterone level in 3 months.  Let me know if this is agreeable.

## 2019-08-23 ENCOUNTER — CLINICAL SUPPORT (OUTPATIENT)
Dept: FAMILY MEDICINE | Facility: CLINIC | Age: 54
End: 2019-08-23
Payer: COMMERCIAL

## 2019-08-23 DIAGNOSIS — E29.1 HYPOGONADISM IN MALE: Primary | ICD-10-CM

## 2019-08-23 PROCEDURE — 99999 PR PBB SHADOW E&M-EST. PATIENT-LVL II: CPT | Mod: PBBFAC,,,

## 2019-08-23 PROCEDURE — 96372 PR INJECTION,THERAP/PROPH/DIAG2ST, IM OR SUBCUT: ICD-10-PCS | Mod: S$GLB,,, | Performed by: FAMILY MEDICINE

## 2019-08-23 PROCEDURE — 99999 PR PBB SHADOW E&M-EST. PATIENT-LVL II: ICD-10-PCS | Mod: PBBFAC,,,

## 2019-08-23 PROCEDURE — 96372 THER/PROPH/DIAG INJ SC/IM: CPT | Mod: S$GLB,,, | Performed by: FAMILY MEDICINE

## 2019-08-23 RX ORDER — TESTOSTERONE CYPIONATE 200 MG/ML
200 INJECTION, SOLUTION INTRAMUSCULAR
Status: COMPLETED | OUTPATIENT
Start: 2019-08-24 | End: 2019-08-23

## 2019-08-23 RX ADMIN — TESTOSTERONE CYPIONATE 200 MG: 200 INJECTION, SOLUTION INTRAMUSCULAR at 04:08

## 2019-08-27 PROBLEM — N41.0 ACUTE PROSTATITIS: Status: ACTIVE | Noted: 2019-08-27

## 2019-08-27 PROBLEM — N41.0 ACUTE PROSTATITIS: Chronic | Status: ACTIVE | Noted: 2019-08-27

## 2019-08-27 NOTE — PROGRESS NOTES
Subjective:      Patient ID: Bebo Galindo is a 54 y.o. male.    Chief Complaint: Prostatitis    Problem List Items Addressed This Visit     Acute prostatitis - Primary    Relevant Medications    sulfamethoxazole-trimethoprim 800-160mg (BACTRIM DS) 800-160 mg Tab    Other Relevant Orders    Urinalysis (Completed)    PSA, Screening (Completed)      Other Visit Diagnoses     Erectile dysfunction due to arterial insufficiency        Relevant Medications    sildenafil (REVATIO) 20 mg Tab    Decreased libido        Relevant Orders    Testosterone (Completed)        He complains of having some seminal overflow that has been going on for months.  It used to be intermittent but it is more frequent now and he will have some in his shorts.  He states that this has been accompanied by ED symptoms in that he can't gain or maintain an erection and he has not been having ejaculation or intercourse for a while. He would like help with that.  He has not had dysuria noted and he has not had any hematuria noted.  He has had a decreased libido noted for a long time also.  He has had stress but he does not feel that this is affecting any of this at this time.      Past Medical History:  Past Medical History:   Diagnosis Date    Allergy     Hyperlipidemia     Hypertension     Inappropriately high serum insulin 5/9/2018    Mitral valve prolapse      Past Surgical History:   Procedure Laterality Date    ANAL FISTULOTOMY  2015    CARDIAC CATHETERIZATION  10/2011    no stent    COLONOSCOPY      menicus repair      SINUS SURGERY      VASECTOMY       Review of patient's allergies indicates:  No Known Allergies  Current Outpatient Medications on File Prior to Visit   Medication Sig Dispense Refill    aspirin 81 MG chewable tablet Take 81 mg by mouth once daily.       escitalopram oxalate (LEXAPRO) 20 MG tablet TAKE 1 TABLET BY MOUTH EVERY DAY 90 tablet 0    rosuvastatin (CRESTOR) 20 MG tablet TAKE 1 TABLET BY MOUTH ONCE  DAILY 90 tablet 0    azelastine (ASTELIN) 137 mcg (0.1 %) nasal spray 1 spray (137 mcg total) by Nasal route 2 (two) times daily. for 7 days 30 mL 0     Current Facility-Administered Medications on File Prior to Visit   Medication Dose Route Frequency Provider Last Rate Last Dose    cefTRIAXone injection 1 g  1 g Intramuscular Q24H Carlos Gillette NP   1 g at 19 1023     Social History     Socioeconomic History    Marital status:      Spouse name: Not on file    Number of children: Not on file    Years of education: Not on file    Highest education level: Not on file   Occupational History    Not on file   Social Needs    Financial resource strain: Not on file    Food insecurity:     Worry: Not on file     Inability: Not on file    Transportation needs:     Medical: Not on file     Non-medical: Not on file   Tobacco Use    Smoking status: Former Smoker     Last attempt to quit: 1995     Years since quittin.3    Smokeless tobacco: Never Used   Substance and Sexual Activity    Alcohol use: No    Drug use: No    Sexual activity: Yes     Partners: Female   Lifestyle    Physical activity:     Days per week: Not on file     Minutes per session: Not on file    Stress: Not on file   Relationships    Social connections:     Talks on phone: Not on file     Gets together: Not on file     Attends Nondenominational service: Not on file     Active member of club or organization: Not on file     Attends meetings of clubs or organizations: Not on file     Relationship status: Not on file   Other Topics Concern    Not on file   Social History Narrative    Not on file     Family History   Problem Relation Age of Onset    Cancer Mother         Stomach    Cancer Father         Colon    Heart disease Father     Cancer Maternal Uncle         Bladder    Heart disease Maternal Uncle     Hypertension Paternal Uncle     Heart disease Paternal Uncle     Diabetes Paternal Grandmother     Heart  "disease Maternal Aunt     Heart disease Paternal Aunt        Review of Systems   Constitutional: Negative for chills and fever.   Respiratory: Negative for cough, shortness of breath and wheezing.    Cardiovascular: Negative for chest pain and palpitations.   Genitourinary: Negative for dysuria, hematuria, penile pain, penile swelling, scrotal swelling, testicular pain and urgency.       Objective:     /80   Pulse 67   Temp 98.2 °F (36.8 °C)   Ht 5' 10" (1.778 m)   Wt 126.1 kg (278 lb)   BMI 39.89 kg/m²     Physical Exam   Constitutional: He is oriented to person, place, and time. He appears well-developed and well-nourished.   HENT:   Head: Normocephalic and atraumatic.   Right Ear: External ear normal.   Left Ear: External ear normal.   Nose: Nose normal.   Mouth/Throat: Oropharynx is clear and moist. No oropharyngeal exudate.   Eyes: Pupils are equal, round, and reactive to light. Conjunctivae and EOM are normal. Right eye exhibits no discharge. Left eye exhibits no discharge. No scleral icterus.   Neck: Normal range of motion. Neck supple. No JVD present. No thyromegaly present.   Cardiovascular: Normal rate, regular rhythm, normal heart sounds and intact distal pulses. Exam reveals no gallop and no friction rub.   No murmur heard.  Pulmonary/Chest: Effort normal and breath sounds normal. No respiratory distress. He has no wheezes. He has no rales. He exhibits no tenderness.   Abdominal: Soft. Bowel sounds are normal. He exhibits no distension and no mass. There is no tenderness. There is no rebound and no guarding. Hernia confirmed negative in the right inguinal area and confirmed negative in the left inguinal area.   Genitourinary: Testes normal and penis normal. Rectal exam shows tenderness. Prostate is enlarged and tender. Right testis shows no mass. Left testis shows no mass.   Musculoskeletal: Normal range of motion. He exhibits no edema or tenderness.   Lymphadenopathy:     He has no cervical " adenopathy. No inguinal adenopathy noted on the right or left side.   Neurological: He is alert and oriented to person, place, and time. No cranial nerve deficit. Coordination normal.   Skin: Skin is warm and dry. He is not diaphoretic.   Psychiatric: He has a normal mood and affect.     Assessment:     1. Acute prostatitis    2. Erectile dysfunction due to arterial insufficiency    3. Decreased libido        Plan:     Problem List Items Addressed This Visit     Acute prostatitis - Primary    Relevant Medications    sulfamethoxazole-trimethoprim 800-160mg (BACTRIM DS) 800-160 mg Tab    Other Relevant Orders    Urinalysis (Completed)    PSA, Screening (Completed)      Other Visit Diagnoses     Erectile dysfunction due to arterial insufficiency        Relevant Medications    sildenafil (REVATIO) 20 mg Tab    Decreased libido        Relevant Orders    Testosterone (Completed)        No follow-ups on file.

## 2019-09-09 ENCOUNTER — LAB VISIT (OUTPATIENT)
Dept: LAB | Facility: HOSPITAL | Age: 54
End: 2019-09-09
Attending: INTERNAL MEDICINE
Payer: COMMERCIAL

## 2019-09-09 ENCOUNTER — OFFICE VISIT (OUTPATIENT)
Dept: NEPHROLOGY | Facility: CLINIC | Age: 54
End: 2019-09-09
Payer: COMMERCIAL

## 2019-09-09 VITALS
BODY MASS INDEX: 40.53 KG/M2 | HEART RATE: 65 BPM | SYSTOLIC BLOOD PRESSURE: 122 MMHG | OXYGEN SATURATION: 96 % | HEIGHT: 70 IN | DIASTOLIC BLOOD PRESSURE: 78 MMHG | WEIGHT: 283.13 LBS

## 2019-09-09 DIAGNOSIS — E88.810 METABOLIC SYNDROME: Chronic | ICD-10-CM

## 2019-09-09 DIAGNOSIS — R80.9 PROTEINURIA, UNSPECIFIED TYPE: ICD-10-CM

## 2019-09-09 DIAGNOSIS — I10 ESSENTIAL HYPERTENSION: ICD-10-CM

## 2019-09-09 DIAGNOSIS — I25.10 CORONARY ARTERY DISEASE, NON-OCCLUSIVE: Chronic | ICD-10-CM

## 2019-09-09 DIAGNOSIS — R80.9 PROTEINURIA, UNSPECIFIED TYPE: Primary | ICD-10-CM

## 2019-09-09 PROCEDURE — 99243 PR OFFICE CONSULTATION,LEVEL III: ICD-10-PCS | Mod: S$GLB,,, | Performed by: INTERNAL MEDICINE

## 2019-09-09 PROCEDURE — 84156 ASSAY OF PROTEIN URINE: CPT

## 2019-09-09 PROCEDURE — 84166 PATHOLOGIST INTERPRETATION UPE: ICD-10-PCS | Mod: 26,,, | Performed by: PATHOLOGY

## 2019-09-09 PROCEDURE — 84166 PROTEIN E-PHORESIS/URINE/CSF: CPT | Mod: 26,,, | Performed by: PATHOLOGY

## 2019-09-09 PROCEDURE — 84166 PROTEIN E-PHORESIS/URINE/CSF: CPT

## 2019-09-09 PROCEDURE — 99999 PR PBB SHADOW E&M-EST. PATIENT-LVL III: ICD-10-PCS | Mod: PBBFAC,,, | Performed by: INTERNAL MEDICINE

## 2019-09-09 PROCEDURE — 99243 OFF/OP CNSLTJ NEW/EST LOW 30: CPT | Mod: S$GLB,,, | Performed by: INTERNAL MEDICINE

## 2019-09-09 PROCEDURE — 99999 PR PBB SHADOW E&M-EST. PATIENT-LVL III: CPT | Mod: PBBFAC,,, | Performed by: INTERNAL MEDICINE

## 2019-09-09 NOTE — LETTER
September 15, 2019      Alejandro Alvarado MD  [address]  [place] [address]           [facility]  [address]  [place] [address]  Phone: [phone]          Patient: Bebo Galindo   MR Number: [id number]   YOB: 1965   Date of Visit: 9/9/2019       Dear Dr. Alejandro Alvarado:    Thank you for referring Bebo Galindo to me for evaluation. Attached you will find relevant portions of my assessment and plan of care.    If you have questions, please do not hesitate to call me. I look forward to following Bebo Galindo along with you.    Sincerely,    Lorenzo Rush MD    Enclosure  CC:  No Recipients    If you would like to receive this communication electronically, please contact [email or web address] or [phone] to request more information on [facility] access.    For providers and/or their staff who would like to refer a patient to [facility], please contact us through our one-stop-shop provider referral line, [facility], at [phone].    If you feel you have received this communication in error or would no longer like to receive these types of communications, please e-mail [email or web address]

## 2019-09-09 NOTE — PROGRESS NOTES
Subjective:       Patient ID: Bebo Galindo is a 54 y.o. White male who presents for new evaluation of Proteinuria    HPI     He is referred by his PCP for proteinuria seen on urine dipstick measured as 2+ with SG of 1.030    He denies HTN (on BB for MVP) and no DM. He is obese. He denies foamy urine, no hematuria and no flank pain. He follows a moderately high sodium diet and feels he stays hydrated now, since hearing of proteinuria. He consumes cola regularly.  No LE edema and no SOB. No NSAID use and no herbal medications. Paternal grandmother had kidney disease, unclear details. He quit smoking 23 years ago. He is currently on Bactrim for prostatitis. He is on Crestor daily    Review of Systems   Constitutional: Negative for activity change, appetite change, fatigue and unexpected weight change.   HENT: Negative for facial swelling.    Respiratory: Negative for cough and shortness of breath.    Cardiovascular: Negative for chest pain and leg swelling.   Gastrointestinal: Negative for abdominal distention.   Genitourinary: Negative for difficulty urinating, dysuria, frequency, hematuria and urgency.   Musculoskeletal: Negative for arthralgias.   Neurological: Negative for weakness and headaches.   Hematological: Does not bruise/bleed easily.   Psychiatric/Behavioral: Negative for decreased concentration.       Objective:      Physical Exam   Constitutional: He is oriented to person, place, and time. He appears well-developed and well-nourished. No distress.   HENT:   Mouth/Throat: Oropharynx is clear and moist.   Eyes: No scleral icterus.   Neck: No JVD present.   Cardiovascular: S1 normal and S2 normal. Exam reveals no friction rub.   Pulmonary/Chest: Breath sounds normal. He has no wheezes. He has no rales.   Abdominal: Soft.   Musculoskeletal: He exhibits no edema.   Neurological: He is alert and oriented to person, place, and time.   Skin: Skin is warm and dry.   Psychiatric: He has a normal mood and  affect.   Nursing note and vitals reviewed.      Assessment:       1. Proteinuria, unspecified type    2. Essential hypertension    3. Metabolic syndrome    4. Coronary artery disease, non-occlusive        Plan:           Proteinuria seen on urine dipstick with a high specific gravity. He is on Crestor and is being treated for prostatitis. In addition he is obese. Otherwise there are no risk factors for proteinuria. Will confirm proteinuria with a upc. Will also check a random UPEP to type the protein. If he does have significant proteinuria he will require further work up     I encouraged for general health to decrease soda further as well as decrease sodium intake.     Labs today--will post results

## 2019-09-10 LAB
CREAT UR-MCNC: 113 MG/DL (ref 23–375)
PROT UR-MCNC: 7 MG/DL (ref 0–15)
PROT/CREAT UR: 0.06 MG/G{CREAT} (ref 0–0.2)

## 2019-09-11 ENCOUNTER — PATIENT MESSAGE (OUTPATIENT)
Dept: NEPHROLOGY | Facility: CLINIC | Age: 54
End: 2019-09-11

## 2019-09-12 LAB
PATHOLOGIST INTERPRETATION UPE: NORMAL
PROT PATTERN UR ELPH-IMP: NORMAL

## 2019-09-19 ENCOUNTER — TELEPHONE (OUTPATIENT)
Dept: FAMILY MEDICINE | Facility: CLINIC | Age: 54
End: 2019-09-19

## 2019-09-19 DIAGNOSIS — E29.1 HYPOGONADISM IN MALE: Primary | ICD-10-CM

## 2019-09-19 RX ORDER — TESTOSTERONE CYPIONATE 200 MG/ML
200 INJECTION, SOLUTION INTRAMUSCULAR
Status: ACTIVE | OUTPATIENT
Start: 2019-09-20 | End: 2020-01-01

## 2019-09-19 NOTE — TELEPHONE ENCOUNTER
Pt scheduled for testosterone injection tomorrow 9/20/19 and does not have an order for injection. Does pt need an OV or labs prior to receiving injection? Please advise. Thanks!

## 2019-09-20 ENCOUNTER — CLINICAL SUPPORT (OUTPATIENT)
Dept: FAMILY MEDICINE | Facility: CLINIC | Age: 54
End: 2019-09-20
Payer: COMMERCIAL

## 2019-09-20 DIAGNOSIS — E29.1 HYPOGONADISM IN MALE: Primary | ICD-10-CM

## 2019-09-20 PROCEDURE — 96372 THER/PROPH/DIAG INJ SC/IM: CPT | Mod: S$GLB,,, | Performed by: FAMILY MEDICINE

## 2019-09-20 PROCEDURE — 99999 PR PBB SHADOW E&M-EST. PATIENT-LVL I: CPT | Mod: PBBFAC,,,

## 2019-09-20 PROCEDURE — 99999 PR PBB SHADOW E&M-EST. PATIENT-LVL I: ICD-10-PCS | Mod: PBBFAC,,,

## 2019-09-20 PROCEDURE — 96372 PR INJECTION,THERAP/PROPH/DIAG2ST, IM OR SUBCUT: ICD-10-PCS | Mod: S$GLB,,, | Performed by: FAMILY MEDICINE

## 2019-09-20 RX ADMIN — TESTOSTERONE CYPIONATE 200 MG: 200 INJECTION, SOLUTION INTRAMUSCULAR at 04:09

## 2019-09-28 ENCOUNTER — HOSPITAL ENCOUNTER (EMERGENCY)
Facility: HOSPITAL | Age: 54
Discharge: HOME | End: 2019-09-28
Attending: EMERGENCY MEDICINE
Payer: COMMERCIAL

## 2019-09-28 VITALS
BODY MASS INDEX: 39.22 KG/M2 | HEIGHT: 70 IN | WEIGHT: 274 LBS | RESPIRATION RATE: 18 BRPM | SYSTOLIC BLOOD PRESSURE: 134 MMHG | OXYGEN SATURATION: 98 % | DIASTOLIC BLOOD PRESSURE: 69 MMHG | TEMPERATURE: 98 F | HEART RATE: 63 BPM

## 2019-09-28 DIAGNOSIS — R10.9 LEFT FLANK PAIN: ICD-10-CM

## 2019-09-28 DIAGNOSIS — N20.1 URETEROLITHIASIS: Primary | ICD-10-CM

## 2019-09-28 LAB
BILIRUBIN, POC UA: NEGATIVE
BLOOD, POC UA: ABNORMAL
CLARITY, POC UA: ABNORMAL
COLOR, POC UA: ABNORMAL
GLUCOSE, POC UA: NEGATIVE
KETONES, POC UA: NEGATIVE
LEUKOCYTE EST, POC UA: NEGATIVE
NITRITE, POC UA: NEGATIVE
PH UR STRIP: 5.5 [PH]
PROTEIN, POC UA: ABNORMAL
SPECIFIC GRAVITY, POC UA: >=1.03
UROBILINOGEN, POC UA: 0.2 E.U./DL

## 2019-09-28 PROCEDURE — 96361 HYDRATE IV INFUSION ADD-ON: CPT | Mod: ER

## 2019-09-28 PROCEDURE — 81003 URINALYSIS AUTO W/O SCOPE: CPT | Mod: ER

## 2019-09-28 PROCEDURE — 63600175 PHARM REV CODE 636 W HCPCS: Mod: ER | Performed by: EMERGENCY MEDICINE

## 2019-09-28 PROCEDURE — 99284 EMERGENCY DEPT VISIT MOD MDM: CPT | Mod: 25,ER

## 2019-09-28 PROCEDURE — 96374 THER/PROPH/DIAG INJ IV PUSH: CPT | Mod: ER

## 2019-09-28 PROCEDURE — 25000003 PHARM REV CODE 250: Mod: ER | Performed by: EMERGENCY MEDICINE

## 2019-09-28 PROCEDURE — 87086 URINE CULTURE/COLONY COUNT: CPT

## 2019-09-28 RX ORDER — ONDANSETRON 8 MG/1
8 TABLET, ORALLY DISINTEGRATING ORAL EVERY 6 HOURS PRN
Qty: 12 TABLET | Refills: 0 | Status: SHIPPED | OUTPATIENT
Start: 2019-09-28 | End: 2020-01-01

## 2019-09-28 RX ORDER — TAMSULOSIN HYDROCHLORIDE 0.4 MG/1
0.4 CAPSULE ORAL DAILY
Qty: 30 CAPSULE | Refills: 0 | Status: SHIPPED | OUTPATIENT
Start: 2019-09-28 | End: 2020-01-01

## 2019-09-28 RX ORDER — TRAMADOL HYDROCHLORIDE 50 MG/1
50 TABLET ORAL EVERY 6 HOURS PRN
Qty: 12 TABLET | Refills: 0 | Status: SHIPPED | OUTPATIENT
Start: 2019-09-28 | End: 2020-01-01

## 2019-09-28 RX ORDER — KETOROLAC TROMETHAMINE 30 MG/ML
30 INJECTION, SOLUTION INTRAMUSCULAR; INTRAVENOUS
Status: COMPLETED | OUTPATIENT
Start: 2019-09-28 | End: 2019-09-28

## 2019-09-28 RX ORDER — IBUPROFEN 800 MG/1
800 TABLET ORAL EVERY 6 HOURS PRN
Qty: 20 TABLET | Refills: 0 | Status: SHIPPED | OUTPATIENT
Start: 2019-09-28 | End: 2020-01-01

## 2019-09-28 RX ORDER — TAMSULOSIN HYDROCHLORIDE 0.4 MG/1
0.4 CAPSULE ORAL
Status: COMPLETED | OUTPATIENT
Start: 2019-09-28 | End: 2019-09-28

## 2019-09-28 RX ADMIN — SODIUM CHLORIDE 1000 ML: 0.9 INJECTION, SOLUTION INTRAVENOUS at 01:09

## 2019-09-28 RX ADMIN — KETOROLAC TROMETHAMINE 30 MG: 30 INJECTION, SOLUTION INTRAMUSCULAR; INTRAVENOUS at 01:09

## 2019-09-28 RX ADMIN — TAMSULOSIN HYDROCHLORIDE 0.4 MG: 0.4 CAPSULE ORAL at 02:09

## 2019-09-28 NOTE — ED PROVIDER NOTES
Encounter Date: 2019       History     Chief Complaint   Patient presents with    Flank Pain     pt presents with c/o left sided flank pain x 2 hours. denies taking any medicine for pain. reports hx of kidney stones     54 y.o. male with hypertension, hyperlipidemia, kidney stone presents to the emergency department complaining of acute, left flank pain that radiates to the left groin that began around 10:15 p.m..  Patient reports associated nausea and vomiting but denies fever, dysuria, frequency or hematuria.  He denies taking medication for pain prior to arrival.    The history is provided by the patient.     Review of patient's allergies indicates:  No Known Allergies  Past Medical History:   Diagnosis Date    Allergy     Hyperlipidemia     Hypertension     Inappropriately high serum insulin 2018    Mitral valve prolapse      Past Surgical History:   Procedure Laterality Date    ANAL FISTULOTOMY      CARDIAC CATHETERIZATION  10/2011    no stent    COLONOSCOPY      menicus repair      SINUS SURGERY      VASECTOMY       Family History   Problem Relation Age of Onset    Cancer Mother         Stomach    Cancer Father         Colon    Heart disease Father     Cancer Maternal Uncle         Bladder    Heart disease Maternal Uncle     Hypertension Paternal Uncle     Heart disease Paternal Uncle     Diabetes Paternal Grandmother     Heart disease Maternal Aunt     Heart disease Paternal Aunt      Social History     Tobacco Use    Smoking status: Former Smoker     Last attempt to quit: 1995     Years since quittin.4    Smokeless tobacco: Never Used   Substance Use Topics    Alcohol use: No    Drug use: No     Review of Systems   Constitutional: Negative for fever.   Gastrointestinal: Positive for nausea and vomiting. Negative for abdominal pain.   Genitourinary: Positive for flank pain.   All other systems reviewed and are negative.      Physical Exam     Initial Vitals  [09/28/19 0039]   BP Pulse Resp Temp SpO2   (!) 144/63 70 18 97.9 °F (36.6 °C) 100 %      MAP       --         Physical Exam    Nursing note and vitals reviewed.  Constitutional: He appears well-developed and well-nourished. He is not diaphoretic. No distress.   HENT:   Head: Normocephalic and atraumatic.   Mouth/Throat: Oropharynx is clear and moist.   Eyes: Conjunctivae and EOM are normal.   Neck: Normal range of motion and phonation normal. Neck supple. No stridor present.   Cardiovascular: Regular rhythm and intact distal pulses.   Pulmonary/Chest: No accessory muscle usage. No tachypnea. No respiratory distress.   Abdominal: He exhibits no distension. There is no tenderness. There is CVA tenderness. There is no rigidity, no rebound, no guarding, no tenderness at McBurney's point and negative Reed's sign.   Musculoskeletal: Normal range of motion. He exhibits no tenderness.   Neurological: He is alert and oriented to person, place, and time.   Skin: Skin is warm and intact.   Psychiatric: He has a normal mood and affect.         ED Course   Procedures  Labs Reviewed   POCT URINALYSIS W/O SCOPE - Abnormal; Notable for the following components:       Result Value    Spec Grav UA >=1.030 (*)     Blood, UA 3+ (*)     Protein, UA 2+ (*)     All other components within normal limits   CULTURE, URINE    Narrative:     Indicated criteria for high risk culture:->Other  Other (specify):->ureterolithiasis   POCT URINALYSIS W/O SCOPE          Imaging Results          CT Renal Stone Study ABD Pelvis WO (Final result)  Result time 09/28/19 01:33:42    Final result by Steve Herring MD (09/28/19 01:33:42)                 Impression:      1. Small 4 mm left proximal ureteral stone without significant hydronephrosis.  2. Small bilateral nonobstructing renal stones.  3. Small left renal hypodense lesion which measures slightly higher than simple fluid density.  This likely represents a cyst, however future nonemergent renal  ultrasound follow-up may be obtained to ensure cystic nature of this lesion.      Electronically signed by: Steve Herring MD  Date:    09/28/2019  Time:    01:33             Narrative:    EXAMINATION:  CT RENAL STONE STUDY ABD PELVIS WO    CLINICAL HISTORY:  Flank pain, stone disease suspected;    TECHNIQUE:  Low dose axial images, sagittal and coronal reformations were obtained from the lung bases to the pubic symphysis.  Contrast was not administered.    COMPARISON:  CT abdomen and pelvis from December 2008.    FINDINGS:  The visualized portion of the heart is unremarkable.  The lung bases are clear.    No significant hepatic abnormality seen on this noncontrast exam.  There is no intra-or extrahepatic biliary ductal dilatation.  The gallbladder is unremarkable.  The stomach, pancreas, spleen, and adrenal glands are unremarkable.    Kidneys are small in size.  Small bilateral nonobstructing renal stones are seen.  Exophytic small left renal hypodense lesion is seen which measures slightly higher than simple fluid density.  There is a 4 mm left proximal ureteral stone.  No significant hydronephrosis.  No stones are seen along the right ureteral course.  Urinary bladder is nondistended.  Prostate is unremarkable.    Appendix is visualized and is unremarkable.  The visualized loops of small and large bowel show no evidence of obstruction or inflammation.  No free air or free fluid.    Aorta tapers normally.    No acute osseous abnormality identified. Subcutaneous soft tissue structures are unremarkable.                                     Labs Reviewed  Admission on 09/28/2019, Discharged on 09/28/2019   Component Date Value Ref Range Status    Glucose, UA 09/28/2019 Negative   Final    Bilirubin, UA 09/28/2019 Negative   Final    Ketones, UA 09/28/2019 Negative   Final    Spec Grav UA 09/28/2019 >=1.030*  Final    Blood, UA 09/28/2019 3+*  Final    PH, UA 09/28/2019 5.5   Final    Protein, UA 09/28/2019 2+*   Final    Urobilinogen, UA 09/28/2019 0.2  E.U./dL Final    Nitrite, UA 09/28/2019 Negative   Final    Leukocytes, UA 09/28/2019 Negative   Final    Color, UA 09/28/2019 Dark yellow   Final    Clarity, UA 09/28/2019 Turbid   Final    Urine Culture, Routine 09/28/2019 No growth   Final        Imaging Reviewed    Imaging Results          CT Renal Stone Study ABD Pelvis WO (Final result)  Result time 09/28/19 01:33:42    Final result by Steve Herring MD (09/28/19 01:33:42)                 Impression:      1. Small 4 mm left proximal ureteral stone without significant hydronephrosis.  2. Small bilateral nonobstructing renal stones.  3. Small left renal hypodense lesion which measures slightly higher than simple fluid density.  This likely represents a cyst, however future nonemergent renal ultrasound follow-up may be obtained to ensure cystic nature of this lesion.      Electronically signed by: Steve Herring MD  Date:    09/28/2019  Time:    01:33             Narrative:    EXAMINATION:  CT RENAL STONE STUDY ABD PELVIS WO    CLINICAL HISTORY:  Flank pain, stone disease suspected;    TECHNIQUE:  Low dose axial images, sagittal and coronal reformations were obtained from the lung bases to the pubic symphysis.  Contrast was not administered.    COMPARISON:  CT abdomen and pelvis from December 2008.    FINDINGS:  The visualized portion of the heart is unremarkable.  The lung bases are clear.    No significant hepatic abnormality seen on this noncontrast exam.  There is no intra-or extrahepatic biliary ductal dilatation.  The gallbladder is unremarkable.  The stomach, pancreas, spleen, and adrenal glands are unremarkable.    Kidneys are small in size.  Small bilateral nonobstructing renal stones are seen.  Exophytic small left renal hypodense lesion is seen which measures slightly higher than simple fluid density.  There is a 4 mm left proximal ureteral stone.  No significant hydronephrosis.  No stones are seen  along the right ureteral course.  Urinary bladder is nondistended.  Prostate is unremarkable.    Appendix is visualized and is unremarkable.  The visualized loops of small and large bowel show no evidence of obstruction or inflammation.  No free air or free fluid.    Aorta tapers normally.    No acute osseous abnormality identified. Subcutaneous soft tissue structures are unremarkable.                                Medications given in ED    Medications   sodium chloride 0.9% bolus 1,000 mL (0 mLs Intravenous Stopped 9/28/19 0241)   ketorolac injection 30 mg (30 mg Intravenous Given 9/28/19 0103)   tamsulosin 24 hr capsule 0.4 mg (0.4 mg Oral Given 9/28/19 0235)       Note was created using voice recognition software. Note may have occasional typographical errors that may not have been identified and edited despite good sameera initial review prior to signing.    Discharge Medications     Discharge Medication List as of 9/28/2019  2:39 AM      START taking these medications    Details   ibuprofen (ADVIL,MOTRIN) 800 MG tablet Take 1 tablet (800 mg total) by mouth every 6 (six) hours as needed for Pain., Starting Sat 9/28/2019, Normal      ondansetron (ZOFRAN-ODT) 8 MG TbDL Take 1 tablet (8 mg total) by mouth every 6 (six) hours as needed (nausea)., Starting Sat 9/28/2019, Normal      tamsulosin (FLOMAX) 0.4 mg Cap Take 1 capsule (0.4 mg total) by mouth once daily., Starting Sat 9/28/2019, Until Sun 9/27/2020, Normal      traMADol (ULTRAM) 50 mg tablet Take 1 tablet (50 mg total) by mouth every 6 (six) hours as needed for Pain., Starting Sat 9/28/2019, Print         CONTINUE these medications which have NOT CHANGED    Details   aspirin 81 MG chewable tablet Take 81 mg by mouth once daily. , Historical Med      escitalopram oxalate (LEXAPRO) 20 MG tablet TAKE 1 TABLET BY MOUTH EVERY DAY, Normal      metoprolol succinate (TOPROL-XL) 50 MG 24 hr tablet Take 1 tablet (50 mg total) by mouth once daily., Starting Tue  8/20/2019, Normal      rosuvastatin (CRESTOR) 20 MG tablet TAKE 1 TABLET BY MOUTH ONCE DAILY, Normal      sildenafil (REVATIO) 20 mg Tab Take 1-5 tablets as needed 1/2-4 hours prior to incercourse, Normal                   Patient discharged to home in stable condition with instructions to:   1. Please take all meds as prescribed.  2. Follow-up with your primary care doctor   3. Return precautions discussed and patient and/or family/caretaker understands to return to the emergency room for any concerns including worsening of your current symptoms, fever, chills, night sweats, worsening pain, chest pain, shortness of breath, nausea, vomiting, diarrhea, bleeding, headache, difficulty talking, visual disturbances, weakness, numbness or any other acute concerns                      Clinical Impression:       ICD-10-CM ICD-9-CM   1. Ureterolithiasis N20.1 592.1   2. Left flank pain R10.9 789.09                                Morena Norman MD  10/06/19 0230

## 2019-09-30 LAB — BACTERIA UR CULT: NO GROWTH

## 2019-10-18 ENCOUNTER — CLINICAL SUPPORT (OUTPATIENT)
Dept: FAMILY MEDICINE | Facility: CLINIC | Age: 54
End: 2019-10-18
Payer: COMMERCIAL

## 2019-10-18 DIAGNOSIS — E29.1 HYPOGONADISM IN MALE: Primary | ICD-10-CM

## 2019-10-18 PROCEDURE — 99999 PR PBB SHADOW E&M-EST. PATIENT-LVL II: CPT | Mod: PBBFAC,,,

## 2019-10-18 PROCEDURE — 99999 PR PBB SHADOW E&M-EST. PATIENT-LVL II: ICD-10-PCS | Mod: PBBFAC,,,

## 2019-10-18 PROCEDURE — 96372 PR INJECTION,THERAP/PROPH/DIAG2ST, IM OR SUBCUT: ICD-10-PCS | Mod: S$GLB,,, | Performed by: FAMILY MEDICINE

## 2019-10-18 PROCEDURE — 96372 THER/PROPH/DIAG INJ SC/IM: CPT | Mod: S$GLB,,, | Performed by: FAMILY MEDICINE

## 2019-10-18 RX ADMIN — TESTOSTERONE CYPIONATE 200 MG: 200 INJECTION, SOLUTION INTRAMUSCULAR at 04:10

## 2019-11-02 DIAGNOSIS — F32.A DEPRESSION, UNSPECIFIED DEPRESSION TYPE: ICD-10-CM

## 2019-11-02 DIAGNOSIS — F41.9 ANXIETY: ICD-10-CM

## 2019-11-02 DIAGNOSIS — E78.5 DYSLIPIDEMIA: ICD-10-CM

## 2019-11-04 RX ORDER — ROSUVASTATIN CALCIUM 20 MG/1
TABLET, COATED ORAL
Qty: 90 TABLET | Refills: 0 | Status: SHIPPED | OUTPATIENT
Start: 2019-11-04 | End: 2020-01-01

## 2019-11-04 RX ORDER — ESCITALOPRAM OXALATE 20 MG/1
TABLET ORAL
Qty: 90 TABLET | Refills: 0 | Status: SHIPPED | OUTPATIENT
Start: 2019-11-04 | End: 2020-01-01

## 2019-11-04 NOTE — TELEPHONE ENCOUNTER
Book a lipid/CMP on the patient.  If the last visit below was over a year, book a physical with the NP and if over 2 years, book with me.    Lab Results   Component Value Date    CHOL 132 05/07/2018    CHOL 146 02/22/2017    CHOL 133 03/18/2016     Lab Results   Component Value Date    HDL 27 (L) 05/07/2018    HDL 32 (L) 02/22/2017    HDL 30 (L) 03/18/2016     Lab Results   Component Value Date    LDLCALC 67.0 05/07/2018    LDLCALC 84.2 02/22/2017    LDLCALC 77.8 03/18/2016     Lab Results   Component Value Date    TRIG 190 (H) 05/07/2018    TRIG 149 02/22/2017    TRIG 126 03/18/2016     Lab Results   Component Value Date    CHOLHDL 20.5 05/07/2018    CHOLHDL 21.9 02/22/2017    CHOLHDL 22.6 03/18/2016     Lab Results   Component Value Date    ALT 52 (H) 05/07/2018    AST 33 05/07/2018    ALKPHOS 75 05/07/2018    BILITOT 0.9 05/07/2018     Admission on 09/28/2019, Discharged on 09/28/2019   Component Date Value Ref Range Status    Glucose, UA 09/28/2019 Negative   Final    Bilirubin, UA 09/28/2019 Negative   Final    Ketones, UA 09/28/2019 Negative   Final    Spec Grav UA 09/28/2019 >=1.030*  Final    Blood, UA 09/28/2019 3+*  Final    PH, UA 09/28/2019 5.5   Final    Protein, UA 09/28/2019 2+*  Final    Urobilinogen, UA 09/28/2019 0.2  E.U./dL Final    Nitrite, UA 09/28/2019 Negative   Final    Leukocytes, UA 09/28/2019 Negative   Final    Color, UA 09/28/2019 Dark yellow   Final    Clarity, UA 09/28/2019 Turbid   Final    Urine Culture, Routine 09/28/2019 No growth   Final

## 2020-01-01 ENCOUNTER — OFFICE VISIT (OUTPATIENT)
Dept: FAMILY MEDICINE | Facility: CLINIC | Age: 55
End: 2020-01-01
Payer: COMMERCIAL

## 2020-01-01 ENCOUNTER — TELEPHONE (OUTPATIENT)
Dept: ENDOSCOPY | Facility: HOSPITAL | Age: 55
End: 2020-01-01

## 2020-01-01 ENCOUNTER — TELEPHONE (OUTPATIENT)
Dept: FAMILY MEDICINE | Facility: CLINIC | Age: 55
End: 2020-01-01

## 2020-01-01 ENCOUNTER — PATIENT MESSAGE (OUTPATIENT)
Dept: FAMILY MEDICINE | Facility: CLINIC | Age: 55
End: 2020-01-01

## 2020-01-01 ENCOUNTER — PATIENT OUTREACH (OUTPATIENT)
Dept: ADMINISTRATIVE | Facility: HOSPITAL | Age: 55
End: 2020-01-01

## 2020-01-01 ENCOUNTER — CLINICAL SUPPORT (OUTPATIENT)
Dept: FAMILY MEDICINE | Facility: CLINIC | Age: 55
End: 2020-01-01
Payer: COMMERCIAL

## 2020-01-01 VITALS
HEART RATE: 63 BPM | WEIGHT: 290.38 LBS | HEIGHT: 70 IN | DIASTOLIC BLOOD PRESSURE: 79 MMHG | SYSTOLIC BLOOD PRESSURE: 123 MMHG | TEMPERATURE: 98 F | BODY MASS INDEX: 41.57 KG/M2

## 2020-01-01 VITALS
DIASTOLIC BLOOD PRESSURE: 80 MMHG | BODY MASS INDEX: 41.09 KG/M2 | SYSTOLIC BLOOD PRESSURE: 130 MMHG | HEIGHT: 70 IN | WEIGHT: 287 LBS | HEART RATE: 70 BPM | TEMPERATURE: 98 F

## 2020-01-01 VITALS
SYSTOLIC BLOOD PRESSURE: 120 MMHG | HEIGHT: 70 IN | TEMPERATURE: 98 F | DIASTOLIC BLOOD PRESSURE: 77 MMHG | HEART RATE: 70 BPM | BODY MASS INDEX: 41.8 KG/M2 | WEIGHT: 292 LBS

## 2020-01-01 VITALS
HEART RATE: 67 BPM | DIASTOLIC BLOOD PRESSURE: 70 MMHG | HEIGHT: 70 IN | SYSTOLIC BLOOD PRESSURE: 114 MMHG | TEMPERATURE: 98 F | BODY MASS INDEX: 41.95 KG/M2 | WEIGHT: 293 LBS

## 2020-01-01 VITALS
BODY MASS INDEX: 41.78 KG/M2 | HEIGHT: 70 IN | WEIGHT: 291.81 LBS | DIASTOLIC BLOOD PRESSURE: 74 MMHG | TEMPERATURE: 99 F | HEART RATE: 59 BPM | SYSTOLIC BLOOD PRESSURE: 129 MMHG

## 2020-01-01 VITALS — TEMPERATURE: 103 F

## 2020-01-01 DIAGNOSIS — M79.10 MUSCLE PAIN: ICD-10-CM

## 2020-01-01 DIAGNOSIS — F41.9 ANXIETY: Primary | ICD-10-CM

## 2020-01-01 DIAGNOSIS — R59.1 LYMPHADENOPATHY: Primary | ICD-10-CM

## 2020-01-01 DIAGNOSIS — Z01.818 PREOP TESTING: ICD-10-CM

## 2020-01-01 DIAGNOSIS — E78.5 DYSLIPIDEMIA: ICD-10-CM

## 2020-01-01 DIAGNOSIS — B34.9 VIRAL SYNDROME: Primary | ICD-10-CM

## 2020-01-01 DIAGNOSIS — Z86.010 HISTORY OF COLON POLYPS: ICD-10-CM

## 2020-01-01 DIAGNOSIS — F32.A DEPRESSION, UNSPECIFIED DEPRESSION TYPE: ICD-10-CM

## 2020-01-01 DIAGNOSIS — Z09 HOSPITAL DISCHARGE FOLLOW-UP: ICD-10-CM

## 2020-01-01 DIAGNOSIS — S50.02XD CONTUSION OF LEFT ELBOW, SUBSEQUENT ENCOUNTER: ICD-10-CM

## 2020-01-01 DIAGNOSIS — L91.8 ACQUIRED SKIN TAG: ICD-10-CM

## 2020-01-01 DIAGNOSIS — Z80.0 FAMILY HISTORY OF COLORECTAL CANCER: ICD-10-CM
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
## 2020-01-01 DIAGNOSIS — Z12.11 COLON CANCER SCREENING: ICD-10-CM

## 2020-01-01 DIAGNOSIS — H92.02 OTALGIA, LEFT: ICD-10-CM

## 2020-01-01 DIAGNOSIS — F41.9 ANXIETY: ICD-10-CM

## 2020-01-01 DIAGNOSIS — S39.012D BACK STRAIN, SUBSEQUENT ENCOUNTER: ICD-10-CM

## 2020-01-01 DIAGNOSIS — L25.5 RHUS DERMATITIS: Primary | ICD-10-CM

## 2020-01-01 DIAGNOSIS — H66.92 LEFT OTITIS MEDIA, UNSPECIFIED OTITIS MEDIA TYPE: ICD-10-CM

## 2020-01-01 DIAGNOSIS — Z12.11 SCREEN FOR COLON CANCER: ICD-10-CM

## 2020-01-01 DIAGNOSIS — M54.2 NECK PAIN: ICD-10-CM

## 2020-01-01 DIAGNOSIS — U07.1 COVID-19 VIRUS DETECTED: ICD-10-CM

## 2020-01-01 LAB
INFLUENZA A, MOLECULAR: NEGATIVE
INFLUENZA B, MOLECULAR: NEGATIVE
SARS-COV-2 RNA RESP QL NAA+PROBE: DETECTED
SPECIMEN SOURCE: NORMAL

## 2020-01-01 PROCEDURE — 3008F PR BODY MASS INDEX (BMI) DOCUMENTED: ICD-10-PCS | Mod: CPTII,S$GLB,, | Performed by: NURSE PRACTITIONER

## 2020-01-01 PROCEDURE — U0003 INFECTIOUS AGENT DETECTION BY NUCLEIC ACID (DNA OR RNA); SEVERE ACUTE RESPIRATORY SYNDROME CORONAVIRUS 2 (SARS-COV-2) (CORONAVIRUS DISEASE [COVID-19]), AMPLIFIED PROBE TECHNIQUE, MAKING USE OF HIGH THROUGHPUT TECHNOLOGIES AS DESCRIBED BY CMS-2020-01-R: HCPCS

## 2020-01-01 PROCEDURE — 11201 RMVL SKIN TAGS EA ADDL 10: CPT | Mod: S$GLB,,, | Performed by: NURSE PRACTITIONER

## 2020-01-01 PROCEDURE — 3074F SYST BP LT 130 MM HG: CPT | Mod: CPTII,S$GLB,, | Performed by: NURSE PRACTITIONER

## 2020-01-01 PROCEDURE — 3079F PR MOST RECENT DIASTOLIC BLOOD PRESSURE 80-89 MM HG: ICD-10-PCS | Mod: CPTII,S$GLB,, | Performed by: NURSE PRACTITIONER

## 2020-01-01 PROCEDURE — 99999 PR PBB SHADOW E&M-EST. PATIENT-LVL IV: CPT | Mod: PBBFAC,,, | Performed by: NURSE PRACTITIONER

## 2020-01-01 PROCEDURE — 99213 OFFICE O/P EST LOW 20 MIN: CPT | Mod: S$GLB,,, | Performed by: NURSE PRACTITIONER

## 2020-01-01 PROCEDURE — 3074F PR MOST RECENT SYSTOLIC BLOOD PRESSURE < 130 MM HG: ICD-10-PCS | Mod: CPTII,S$GLB,, | Performed by: NURSE PRACTITIONER

## 2020-01-01 PROCEDURE — 3078F PR MOST RECENT DIASTOLIC BLOOD PRESSURE < 80 MM HG: ICD-10-PCS | Mod: CPTII,S$GLB,, | Performed by: NURSE PRACTITIONER

## 2020-01-01 PROCEDURE — 11201 PR REMOVAL OF SKIN TAGS, EACH ADD 10: ICD-10-PCS | Mod: S$GLB,,, | Performed by: NURSE PRACTITIONER

## 2020-01-01 PROCEDURE — 3078F DIAST BP <80 MM HG: CPT | Mod: CPTII,S$GLB,, | Performed by: NURSE PRACTITIONER

## 2020-01-01 PROCEDURE — 99213 PR OFFICE/OUTPT VISIT, EST, LEVL III, 20-29 MIN: ICD-10-PCS | Mod: 25,S$GLB,, | Performed by: FAMILY MEDICINE

## 2020-01-01 PROCEDURE — 3008F BODY MASS INDEX DOCD: CPT | Mod: CPTII,S$GLB,, | Performed by: NURSE PRACTITIONER

## 2020-01-01 PROCEDURE — 99999 PR PBB SHADOW E&M-EST. PATIENT-LVL III: ICD-10-PCS | Mod: PBBFAC,,, | Performed by: FAMILY MEDICINE

## 2020-01-01 PROCEDURE — 96372 PR INJECTION,THERAP/PROPH/DIAG2ST, IM OR SUBCUT: ICD-10-PCS | Mod: S$GLB,,, | Performed by: FAMILY MEDICINE

## 2020-01-01 PROCEDURE — 99999 PR PBB SHADOW E&M-EST. PATIENT-LVL IV: ICD-10-PCS | Mod: PBBFAC,,, | Performed by: NURSE PRACTITIONER

## 2020-01-01 PROCEDURE — 3008F BODY MASS INDEX DOCD: CPT | Mod: CPTII,S$GLB,, | Performed by: FAMILY MEDICINE

## 2020-01-01 PROCEDURE — 99999 PR PBB SHADOW E&M-EST. PATIENT-LVL III: ICD-10-PCS | Mod: PBBFAC,,, | Performed by: NURSE PRACTITIONER

## 2020-01-01 PROCEDURE — 11200 RMVL SKIN TAGS UP TO&INC 15: CPT | Mod: S$GLB,,, | Performed by: NURSE PRACTITIONER

## 2020-01-01 PROCEDURE — 99213 PR OFFICE/OUTPT VISIT, EST, LEVL III, 20-29 MIN: ICD-10-PCS | Mod: S$GLB,,, | Performed by: NURSE PRACTITIONER

## 2020-01-01 PROCEDURE — 99213 PR OFFICE/OUTPT VISIT, EST, LEVL III, 20-29 MIN: ICD-10-PCS | Mod: 95,,, | Performed by: NURSE PRACTITIONER

## 2020-01-01 PROCEDURE — 3078F PR MOST RECENT DIASTOLIC BLOOD PRESSURE < 80 MM HG: ICD-10-PCS | Mod: CPTII,S$GLB,, | Performed by: FAMILY MEDICINE

## 2020-01-01 PROCEDURE — 99213 OFFICE O/P EST LOW 20 MIN: CPT | Mod: 25,S$GLB,, | Performed by: FAMILY MEDICINE

## 2020-01-01 PROCEDURE — 3074F SYST BP LT 130 MM HG: CPT | Mod: CPTII,S$GLB,, | Performed by: FAMILY MEDICINE

## 2020-01-01 PROCEDURE — 87502 INFLUENZA DNA AMP PROBE: CPT | Mod: PO

## 2020-01-01 PROCEDURE — 99999 PR PBB SHADOW E&M-EST. PATIENT-LVL III: CPT | Mod: PBBFAC,,, | Performed by: NURSE PRACTITIONER

## 2020-01-01 PROCEDURE — 99214 PR OFFICE/OUTPT VISIT, EST, LEVL IV, 30-39 MIN: ICD-10-PCS | Mod: 25,S$GLB,, | Performed by: NURSE PRACTITIONER

## 2020-01-01 PROCEDURE — 3079F DIAST BP 80-89 MM HG: CPT | Mod: CPTII,S$GLB,, | Performed by: NURSE PRACTITIONER

## 2020-01-01 PROCEDURE — 3008F PR BODY MASS INDEX (BMI) DOCUMENTED: ICD-10-PCS | Mod: CPTII,S$GLB,, | Performed by: FAMILY MEDICINE

## 2020-01-01 PROCEDURE — 99999 PR PBB SHADOW E&M-EST. PATIENT-LVL III: CPT | Mod: PBBFAC,,, | Performed by: FAMILY MEDICINE

## 2020-01-01 PROCEDURE — 99214 OFFICE O/P EST MOD 30 MIN: CPT | Mod: 25,S$GLB,, | Performed by: NURSE PRACTITIONER

## 2020-01-01 PROCEDURE — 99213 OFFICE O/P EST LOW 20 MIN: CPT | Mod: 95,,, | Performed by: NURSE PRACTITIONER

## 2020-01-01 PROCEDURE — 3074F PR MOST RECENT SYSTOLIC BLOOD PRESSURE < 130 MM HG: ICD-10-PCS | Mod: CPTII,S$GLB,, | Performed by: FAMILY MEDICINE

## 2020-01-01 PROCEDURE — 3075F SYST BP GE 130 - 139MM HG: CPT | Mod: CPTII,S$GLB,, | Performed by: NURSE PRACTITIONER

## 2020-01-01 PROCEDURE — 3075F PR MOST RECENT SYSTOLIC BLOOD PRESS GE 130-139MM HG: ICD-10-PCS | Mod: CPTII,S$GLB,, | Performed by: NURSE PRACTITIONER

## 2020-01-01 PROCEDURE — 3078F DIAST BP <80 MM HG: CPT | Mod: CPTII,S$GLB,, | Performed by: FAMILY MEDICINE

## 2020-01-01 PROCEDURE — 11200 PR REMOVAL OF SKIN TAGS, UP TO 15: ICD-10-PCS | Mod: S$GLB,,, | Performed by: NURSE PRACTITIONER

## 2020-01-01 PROCEDURE — 96372 THER/PROPH/DIAG INJ SC/IM: CPT | Mod: S$GLB,,, | Performed by: FAMILY MEDICINE

## 2020-01-01 RX ORDER — ROSUVASTATIN CALCIUM 20 MG/1
TABLET, COATED ORAL
Qty: 90 TABLET | Refills: 0 | Status: SHIPPED | OUTPATIENT
Start: 2020-01-01 | End: 2020-01-01

## 2020-01-01 RX ORDER — ESCITALOPRAM OXALATE 20 MG/1
TABLET ORAL
Qty: 90 TABLET | Refills: 0 | Status: SHIPPED | OUTPATIENT
Start: 2020-01-01 | End: 2020-01-01

## 2020-01-01 RX ORDER — DEXAMETHASONE SODIUM PHOSPHATE 4 MG/ML
8 INJECTION, SOLUTION INTRA-ARTICULAR; INTRALESIONAL; INTRAMUSCULAR; INTRAVENOUS; SOFT TISSUE ONCE
Status: COMPLETED | OUTPATIENT
Start: 2020-01-01 | End: 2020-01-01

## 2020-01-01 RX ORDER — AZITHROMYCIN 250 MG/1
TABLET, FILM COATED ORAL
Qty: 6 TABLET | Refills: 0 | Status: SHIPPED | OUTPATIENT
Start: 2020-01-01 | End: 2020-01-01

## 2020-01-01 RX ORDER — ZOLPIDEM TARTRATE 10 MG/1
10 TABLET ORAL NIGHTLY PRN
Qty: 30 TABLET | Refills: 0 | Status: SHIPPED | OUTPATIENT
Start: 2020-01-01 | End: 2021-02-19

## 2020-01-01 RX ORDER — ALBUTEROL SULFATE 90 UG/1
2 AEROSOL, METERED RESPIRATORY (INHALATION) EVERY 6 HOURS PRN
Qty: 18 G | Refills: 0 | Status: SHIPPED | OUTPATIENT
Start: 2020-01-01 | End: 2021-12-01

## 2020-01-01 RX ORDER — KETOROLAC TROMETHAMINE 10 MG/1
10 TABLET, FILM COATED ORAL 2 TIMES DAILY
Qty: 10 TABLET | Refills: 0 | Status: SHIPPED | OUTPATIENT
Start: 2020-01-01 | End: 2020-01-01

## 2020-01-01 RX ORDER — PREDNISONE 20 MG/1
20 TABLET ORAL 2 TIMES DAILY
Qty: 10 TABLET | Refills: 0 | Status: SHIPPED | OUTPATIENT
Start: 2020-01-01 | End: 2020-01-01

## 2020-01-01 RX ORDER — AMOXICILLIN AND CLAVULANATE POTASSIUM 875; 125 MG/1; MG/1
1 TABLET, FILM COATED ORAL EVERY 12 HOURS
Qty: 20 TABLET | Refills: 0 | Status: SHIPPED | OUTPATIENT
Start: 2020-01-01 | End: 2020-01-01

## 2020-01-01 RX ORDER — SODIUM, POTASSIUM,MAG SULFATES 17.5-3.13G
SOLUTION, RECONSTITUTED, ORAL ORAL
Qty: 354 ML | Refills: 0 | Status: SHIPPED | OUTPATIENT
Start: 2020-01-01

## 2020-01-01 RX ORDER — SODIUM, POTASSIUM,MAG SULFATES 17.5-3.13G
SOLUTION, RECONSTITUTED, ORAL ORAL
Qty: 1 KIT | Refills: 0 | Status: ON HOLD | OUTPATIENT
Start: 2020-01-01 | End: 2020-01-01 | Stop reason: CLARIF

## 2020-01-01 RX ORDER — DESVENLAFAXINE 50 MG/1
1 TABLET, EXTENDED RELEASE ORAL DAILY
Qty: 90 TABLET | Refills: 3 | Status: ON HOLD | OUTPATIENT
Start: 2020-01-01 | End: 2020-01-01 | Stop reason: CLARIF

## 2020-01-01 RX ORDER — ACETAMINOPHEN AND CODEINE PHOSPHATE 300; 30 MG/1; MG/1
1 TABLET ORAL EVERY 4 HOURS PRN
Qty: 20 TABLET | Refills: 0 | Status: SHIPPED | OUTPATIENT
Start: 2020-01-01 | End: 2020-01-01

## 2020-01-01 RX ORDER — ESCITALOPRAM OXALATE 20 MG/1
TABLET ORAL
Qty: 90 TABLET | Refills: 0 | Status: SHIPPED | OUTPATIENT
Start: 2020-01-01 | End: 2020-01-01 | Stop reason: ALTCHOICE

## 2020-01-01 RX ORDER — METHOCARBAMOL 750 MG/1
750 TABLET, FILM COATED ORAL 4 TIMES DAILY
COMMUNITY
Start: 2020-01-01 | End: 2020-01-01

## 2020-01-01 RX ORDER — HYDROCODONE BITARTRATE AND ACETAMINOPHEN 7.5; 325 MG/1; MG/1
1 TABLET ORAL EVERY 6 HOURS PRN
COMMUNITY
Start: 2020-01-01 | End: 2020-01-01

## 2020-01-01 RX ORDER — IBUPROFEN 800 MG/1
800 TABLET ORAL EVERY 8 HOURS PRN
Qty: 30 TABLET | Refills: 0 | Status: SHIPPED | OUTPATIENT
Start: 2020-01-01 | End: 2020-01-01

## 2020-01-01 RX ORDER — DESVENLAFAXINE 50 MG/1
1 TABLET, EXTENDED RELEASE ORAL DAILY
COMMUNITY
Start: 2020-01-01 | End: 2020-01-01 | Stop reason: SDUPTHER

## 2020-01-01 RX ORDER — DESVENLAFAXINE SUCCINATE 50 MG/1
50 TABLET, EXTENDED RELEASE ORAL DAILY
Qty: 30 TABLET | Refills: 11 | Status: SHIPPED | OUTPATIENT
Start: 2020-01-01 | End: 2021-08-21

## 2020-01-01 RX ADMIN — DEXAMETHASONE SODIUM PHOSPHATE 8 MG: 4 INJECTION, SOLUTION INTRA-ARTICULAR; INTRALESIONAL; INTRAMUSCULAR; INTRAVENOUS; SOFT TISSUE at 08:07

## 2020-03-19 NOTE — TELEPHONE ENCOUNTER
Book a lipid/CMP and PSA  on the patient.  If the last visit below was over a year, book a physical with the NP and if over 2 years, book with me.    Lab Results   Component Value Date    CHOL 132 05/07/2018    CHOL 146 02/22/2017    CHOL 133 03/18/2016     Lab Results   Component Value Date    HDL 27 (L) 05/07/2018    HDL 32 (L) 02/22/2017    HDL 30 (L) 03/18/2016     Lab Results   Component Value Date    LDLCALC 67.0 05/07/2018    LDLCALC 84.2 02/22/2017    LDLCALC 77.8 03/18/2016     Lab Results   Component Value Date    TRIG 190 (H) 05/07/2018    TRIG 149 02/22/2017    TRIG 126 03/18/2016     Lab Results   Component Value Date    CHOLHDL 20.5 05/07/2018    CHOLHDL 21.9 02/22/2017    CHOLHDL 22.6 03/18/2016     Lab Results   Component Value Date    ALT 52 (H) 05/07/2018    AST 33 05/07/2018    ALKPHOS 75 05/07/2018    BILITOT 0.9 05/07/2018     Admission on 09/28/2019, Discharged on 09/28/2019   Component Date Value Ref Range Status    Glucose, UA 09/28/2019 Negative   Final    Bilirubin, UA 09/28/2019 Negative   Final    Ketones, UA 09/28/2019 Negative   Final    Spec Grav UA 09/28/2019 >=1.030*  Final    Blood, UA 09/28/2019 3+*  Final    PH, UA 09/28/2019 5.5   Final    Protein, UA 09/28/2019 2+*  Final    Urobilinogen, UA 09/28/2019 0.2  E.U./dL Final    Nitrite, UA 09/28/2019 Negative   Final    Leukocytes, UA 09/28/2019 Negative   Final    Color, UA 09/28/2019 Dark yellow   Final    Clarity, UA 09/28/2019 Turbid   Final    Urine Culture, Routine 09/28/2019 No growth   Final
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
## 2020-07-14 NOTE — LETTER
July 14, 2020      [facility]  [address] [address]  Phone: [phone]  Fax: [phone]       Patient: Bebo Galindo   YOB: 1965  Date of Visit: 07/14/2020    To Whom It May Concern:    Pal Galindo  was at [facility] on 07/14/2020. He may return to work/school on 7/22/2020 with no restrictions. If you have any questions or concerns, or if I can be of further assistance, please do not hesitate to contact me.    Sincerely,    Carlos Gillette NP

## 2020-07-29 NOTE — PROGRESS NOTES
The patient presents with itchy rash for the past 3 days after rhus exposure No dyspnea dysphagia   but no fever.  ROS:  General: No fever or sig wt change  HEENT:No other PND eye pain or dc  Respiratory: No cough wheezing  PE: vital signs noted  HEENT: Normocephalic,with no recent trauma,PERRLA,EOMI,conjunctiva injected with no exudate.Nasopharynx is injected and edematous.External otic canal edematous and injected TM dull  Neck:Supple without adenopathy  Chest:Clear bilateral breath sounds with mild scattered ronchi  Heart:Regular rhthym without murmer  Abdomen:Soft, non tender,no masses, no hepatosplenomegaly  Extremeties Arm rash w vesicles   Impression: Rhus dermititis  Plan:  Dexa 8im H1 H2 and symptomatic fu

## 2020-08-20 NOTE — PROGRESS NOTES
Subjective:       Patient ID: Bebo Galindo is a 55 y.o. male.    Chief Complaint: Anxiety and Skin Tags    He reports having multiple skin tags, these are varying in size and location.  The majority of these are located to the neck and axillary area.  Some of the larger tax gets caught on his clothing.  He would like to have as many of these removed as possible.    Anxiety  Presents for follow-up visit. Symptoms include depressed mood, insomnia and nervous/anxious behavior. Patient reports no chest pain, dizziness, nausea, palpitations or shortness of breath. Symptoms occur most days. The severity of symptoms is moderate. The quality of sleep is fair. Nighttime awakenings: one to two.           Review of Systems   Constitutional: Negative for fatigue, fever and unexpected weight change.   HENT: Negative for ear pain and sore throat.    Eyes: Negative.  Negative for pain and visual disturbance.   Respiratory: Negative for cough and shortness of breath.    Cardiovascular: Negative for chest pain and palpitations.   Gastrointestinal: Negative for abdominal pain, diarrhea, nausea and vomiting.   Genitourinary: Negative for dysuria and frequency.   Musculoskeletal: Negative for arthralgias and myalgias.   Skin: Negative for color change and rash.   Neurological: Negative for dizziness and headaches.   Psychiatric/Behavioral: Positive for dysphoric mood and sleep disturbance. The patient is nervous/anxious and has insomnia.        Vitals:    08/21/20 0704   BP: 123/79   Pulse: 63   Temp: 98.1 °F (36.7 °C)       Objective:     Current Outpatient Medications   Medication Sig Dispense Refill    aspirin 81 MG chewable tablet Take 81 mg by mouth once daily.       metoprolol succinate (TOPROL-XL) 50 MG 24 hr tablet Take 1 tablet (50 mg total) by mouth once daily. 90 tablet 11    rosuvastatin (CRESTOR) 20 MG tablet TAKE 1 TABLET BY MOUTH EVERY DAY 90 tablet 0    desvenlafaxine succinate (PRISTIQ) 50 MG Tb24 Take 1  tablet (50 mg total) by mouth once daily. 30 tablet 11    zolpidem (AMBIEN) 10 mg Tab Take 1 tablet (10 mg total) by mouth nightly as needed. 30 tablet 0     Current Facility-Administered Medications   Medication Dose Route Frequency Provider Last Rate Last Dose    cefTRIAXone injection 1 g  1 g Intramuscular Q24H Carlos Gillette NP   1 g at 06/03/19 1023       Physical Exam  Vitals signs and nursing note reviewed.   Constitutional:       General: He is not in acute distress.     Appearance: He is well-developed.   HENT:      Head: Normocephalic and atraumatic.   Eyes:      Pupils: Pupils are equal, round, and reactive to light.   Neck:      Musculoskeletal: Normal range of motion and neck supple.   Cardiovascular:      Rate and Rhythm: Normal rate and regular rhythm.   Pulmonary:      Effort: Pulmonary effort is normal.      Breath sounds: Normal breath sounds.   Musculoskeletal: Normal range of motion.   Skin:     General: Skin is warm and dry.      Findings: No rash.   Neurological:      Mental Status: He is alert and oriented to person, place, and time.   Psychiatric:         Thought Content: Thought content normal.         Consent signed for skin tag removal.  A total of 29 tags were removed from the left axilla, right axilla, and anterior neck.  Patient tolerated well.    Assessment:       1. Anxiety    2. Acquired skin tag        Plan:   Anxiety    Acquired skin tag    Other orders  -     desvenlafaxine succinate (PRISTIQ) 50 MG Tb24; Take 1 tablet (50 mg total) by mouth once daily.  Dispense: 30 tablet; Refill: 11  -     zolpidem (AMBIEN) 10 mg Tab; Take 1 tablet (10 mg total) by mouth nightly as needed.  Dispense: 30 tablet; Refill: 0        No follow-ups on file.    There are no Patient Instructions on file for this visit.

## 2020-08-21 NOTE — LETTER
August 21, 2020      [facility]  [address] [address]  Phone: [phone]  Fax: [phone]       Patient: Bebo Galindo   YOB: 1965  Date of Visit: 08/21/2020    To Whom It May Concern:    Pal Galindo  was at [facility] on 08/21/2020. He may return to work on 09/07/2020 with no restrictions. If you have any questions or concerns, or if I can be of further assistance, please do not hesitate to contact me.    Sincerely,    Kirstie De La Paz LPN

## 2020-09-03 NOTE — PROGRESS NOTES
Subjective:       Patient ID: Bebo Galindo is a 55 y.o. male.    Chief Complaint: Follow-up (letter)    Mood has improved with Pristiq 50 mg daily.   Anxiety  Presents for follow-up visit. Patient reports no chest pain, dizziness, nausea, nervous/anxious behavior, palpitations or shortness of breath. Symptoms occur rarely. The severity of symptoms is mild. The quality of sleep is good. Nighttime awakenings: occasional.        He is due for health maintenance update.  He agrees to schedule colonoscopy, he has a family history of colon cancer and has previously had colon polyps.    Review of Systems   Constitutional: Negative for fatigue, fever and unexpected weight change.   HENT: Negative for ear pain and sore throat.    Eyes: Negative.  Negative for pain and visual disturbance.   Respiratory: Negative for cough and shortness of breath.    Cardiovascular: Negative for chest pain and palpitations.   Gastrointestinal: Negative for abdominal pain, diarrhea, nausea and vomiting.   Genitourinary: Negative for dysuria and frequency.   Musculoskeletal: Negative for arthralgias and myalgias.   Skin: Negative for color change and rash.   Neurological: Negative for dizziness and headaches.   Psychiatric/Behavioral: Negative for sleep disturbance. The patient is not nervous/anxious.        Vitals:    09/04/20 0808   BP: 130/80   Pulse: 70   Temp: 97.7 °F (36.5 °C)       Objective:     Current Outpatient Medications   Medication Sig Dispense Refill    aspirin 81 MG chewable tablet Take 81 mg by mouth once daily.       desvenlafaxine succinate (PRISTIQ) 50 MG Tb24 Take 1 tablet (50 mg total) by mouth once daily. 30 tablet 11    metoprolol succinate (TOPROL-XL) 50 MG 24 hr tablet Take 1 tablet (50 mg total) by mouth once daily. 90 tablet 11    rosuvastatin (CRESTOR) 20 MG tablet TAKE 1 TABLET BY MOUTH EVERY DAY 90 tablet 0    zolpidem (AMBIEN) 10 mg Tab Take 1 tablet (10 mg total) by mouth nightly as needed. 30 tablet  0    desvenlafaxine 50 mg Tb24 Take 50 mg by mouth once daily. 90 tablet 3     Current Facility-Administered Medications   Medication Dose Route Frequency Provider Last Rate Last Dose    cefTRIAXone injection 1 g  1 g Intramuscular Q24H Carlos Gillette NP   1 g at 06/03/19 1023       Physical Exam  Vitals signs and nursing note reviewed.   Constitutional:       General: He is not in acute distress.     Appearance: He is well-developed.   HENT:      Head: Normocephalic and atraumatic.   Eyes:      Pupils: Pupils are equal, round, and reactive to light.   Neck:      Musculoskeletal: Normal range of motion and neck supple.   Cardiovascular:      Rate and Rhythm: Normal rate and regular rhythm.   Pulmonary:      Effort: Pulmonary effort is normal.      Breath sounds: Normal breath sounds.   Musculoskeletal: Normal range of motion.   Skin:     General: Skin is warm and dry.      Findings: No rash.   Neurological:      Mental Status: He is alert and oriented to person, place, and time.   Psychiatric:         Thought Content: Thought content normal.         Assessment:       1. Anxiety    2. Colon cancer screening    3. History of colon polyps    4. Family history of colorectal cancer        Plan:   Anxiety    Colon cancer screening  -     Case request GI: COLONOSCOPY    History of colon polyps  -     Case request GI: COLONOSCOPY    Family history of colorectal cancer  -     Case request GI: COLONOSCOPY    Other orders  -     desvenlafaxine 50 mg Tb24; Take 50 mg by mouth once daily.  Dispense: 90 tablet; Refill: 3        No follow-ups on file.    There are no Patient Instructions on file for this visit.

## 2020-09-04 NOTE — LETTER
September 4, 2020      [facility]  [address] [address]  Phone: [phone]  Fax: [phone]       Patient: Bebo Galindo   YOB: 1965  Date of Visit: 09/04/2020    To Whom It May Concern:    Pal Galindo  was at [facility] on 09/04/2020. He may return to work/school on 6/8/2020 with no restrictions. He has been prescribed Pristiq 50 mg daily, this will not cause symptoms that will prevent him from performing necessary job duties. If you have any questions or concerns, or if I can be of further assistance, please do not hesitate to contact me.    Sincerely,    Carlos Gillette NP

## 2020-09-08 NOTE — TELEPHONE ENCOUNTER
Spoke with patient's wife Ariela in regards to scheduling procedure. Wife stated that she will speak with  and return call to schedule, number provided.

## 2020-11-03 NOTE — TELEPHONE ENCOUNTER
Wife Patricia called stating return to work letter written for patient was dated incorrect, I rewrote the letter with the correct date and sent it to patient mychart portal

## 2020-11-03 NOTE — LETTER
November 3, 2020      [facility]  [address] [address]  Phone: [phone]  Fax: [phone]       Patient: Bebo Galindo   YOB: 1965  Date of Visit: 09/04/2020    To Whom It May Concern:    Pal Galindo  was at [facility] on 09/04/2020. He may return to work on 09/21/2020 with no restrictions. The medications prescribed will not affect Bebo Galindo's job duties. Bebo was prescribed Pristiq 50mg daily which will not affect any job duties. If you have any questions or concerns, or if I can be of further assistance, please do not hesitate to contact me.    Sincerely,    Kirstie De La Paz LPN

## 2020-11-03 NOTE — TELEPHONE ENCOUNTER
----- Message from Tanya Lopez sent at 11/3/2020  8:51 AM CST -----  Regarding: Discuss a letter  Contact: Pt wife Patricia  Pt is calling the staff requesting a call back to discuss  a letter that was sent to the pt while on leave.    Pt call back [phone]    thanks

## 2020-11-09 NOTE — PROGRESS NOTES
Subjective:       Patient ID: Bebo Galindo is a 55 y.o. male.    Chief Complaint: Jaw Pain (left side )    Otalgia   There is pain in the left ear. This is a new problem. The current episode started today. The problem occurs constantly. The problem has been unchanged. There has been no fever. The pain is moderate. Associated symptoms include neck pain (left). Pertinent negatives include no abdominal pain, coughing, diarrhea, ear discharge, headaches, hearing loss, rash, rhinorrhea, sore throat or vomiting. He has tried nothing for the symptoms. The treatment provided no relief. There is no history of a chronic ear infection, hearing loss or a tympanostomy tube.     Past Medical History:   Diagnosis Date    Allergy     Hyperlipidemia     Hypertension     Inappropriately high serum insulin 2018    Mitral valve prolapse      Social History     Socioeconomic History    Marital status:      Spouse name: Not on file    Number of children: Not on file    Years of education: Not on file    Highest education level: Not on file   Occupational History    Not on file   Social Needs    Financial resource strain: Not on file    Food insecurity     Worry: Not on file     Inability: Not on file    Transportation needs     Medical: Not on file     Non-medical: Not on file   Tobacco Use    Smoking status: Former Smoker     Quit date: 1995     Years since quittin.5    Smokeless tobacco: Never Used   Substance and Sexual Activity    Alcohol use: No    Drug use: No    Sexual activity: Yes     Partners: Female   Lifestyle    Physical activity     Days per week: Not on file     Minutes per session: Not on file    Stress: Not on file   Relationships    Social connections     Talks on phone: Not on file     Gets together: Not on file     Attends Spiritism service: Not on file     Active member of club or organization: Not on file     Attends meetings of clubs or organizations: Not on file      Relationship status: Not on file   Other Topics Concern    Not on file   Social History Narrative    Not on file     Past Surgical History:   Procedure Laterality Date    ANAL FISTULOTOMY  2015    CARDIAC CATHETERIZATION  10/2011    no stent    COLONOSCOPY      menicus repair      SINUS SURGERY      VASECTOMY         Review of Systems   Constitutional: Negative.    HENT: Positive for ear pain. Negative for ear discharge, hearing loss, rhinorrhea and sore throat.    Eyes: Negative.    Respiratory: Negative.  Negative for cough.    Cardiovascular: Negative.    Gastrointestinal: Negative.  Negative for abdominal pain, diarrhea and vomiting.   Endocrine: Negative.    Genitourinary: Negative.    Musculoskeletal: Positive for neck pain (left).   Integumentary:  Negative for rash. Negative.   Allergic/Immunologic: Negative.    Neurological: Negative.  Negative for headaches.   Psychiatric/Behavioral: Negative.          Objective:      Physical Exam  Vitals signs and nursing note reviewed.   Constitutional:       Appearance: Normal appearance.   HENT:      Head: Normocephalic.      Right Ear: Hearing, tympanic membrane, ear canal and external ear normal.      Left Ear: Ear canal and external ear normal. Swelling and tenderness present. Tympanic membrane is injected.      Nose: Nose normal.      Mouth/Throat:      Mouth: Mucous membranes are moist.      Pharynx: Oropharynx is clear.   Eyes:      Conjunctiva/sclera: Conjunctivae normal.      Pupils: Pupils are equal, round, and reactive to light.   Neck:      Musculoskeletal: Normal range of motion and neck supple.   Cardiovascular:      Rate and Rhythm: Normal rate and regular rhythm.      Pulses: Normal pulses.      Heart sounds: Normal heart sounds.   Pulmonary:      Effort: Pulmonary effort is normal.      Breath sounds: Normal breath sounds.   Abdominal:      General: Bowel sounds are normal.      Palpations: Abdomen is soft.   Musculoskeletal: Normal range of  motion.   Lymphadenopathy:      Head:      Right side of head: No submental, submandibular, tonsillar, preauricular, posterior auricular or occipital adenopathy.      Left side of head: Tonsillar and posterior auricular adenopathy present. No submental, submandibular, preauricular or occipital adenopathy.   Skin:     General: Skin is warm and dry.      Capillary Refill: Capillary refill takes 2 to 3 seconds.   Neurological:      Mental Status: He is alert and oriented to person, place, and time.   Psychiatric:         Mood and Affect: Mood normal.         Behavior: Behavior normal.         Thought Content: Thought content normal.         Judgment: Judgment normal.         Assessment:       1. Lymphadenopathy    2. Left otitis media, unspecified otitis media type   3. Otalgia, left    4.      Neck pain   Plan:           Bebo was seen today for jaw pain.    Diagnoses and all orders for this visit:    Lymphadenopathy  Left otitis media, unspecified otitis media type  Otalgia, left  Neck pain  -     amoxicillin-clavulanate 875-125mg (AUGMENTIN) 875-125 mg per tablet; Take 1 tablet by mouth every 12 (twelve) hours. for 10 days  -     ibuprofen (ADVIL,MOTRIN) 800 MG tablet; Take 1 tablet (800 mg total) by mouth every 8 (eight) hours as needed for Pain.  Alternate warm and cool compresses to affected area  Report to ER immediately if symptoms worsen

## 2020-11-12 NOTE — TELEPHONE ENCOUNTER
----- Message from Sofia Salazar sent at 11/12/2020  1:48 PM CST -----  Regarding: appt  Contact: wife- Patricia Gomez states that patients jaw pain is getting worse and is now effecting his eye would like to be seen today, please call her back at [phone]

## 2020-11-12 NOTE — TELEPHONE ENCOUNTER
Pt's wife states that he was seen 11/9/20 by Moira Oscar and was given abx and pain medication. She states he has gotten worse. His face is swollen. His eye is swollen and his vision is now affected. He is in a lot of pain and the swelling continues to worsen. Instructed to take him to ER to be evaluated. She is agreeable and states she will take him now.

## 2020-11-27 NOTE — PROGRESS NOTES
Subjective:       Patient ID: Bebo Galindo is a 55 y.o. male.    Chief Complaint: No chief complaint on file.    Primary Care Telemedicine Note    The patient location is:  Patient Home   The chief complaint leading to consultation is: fever  Total time spent with patient: 15 mins    Visit type: Virtual visit with synchronous audio only and video  Each patient to whom he or she provides medical services by telemedicine is:  (1) informed of the relationship between the physician and patient and the respective role of any other health care provider with respect to management of the patient; and (2) notified that he or she may decline to receive medical services by telemedicine and may withdraw from such care at any time.      Sinus Problem  This is a new problem. Episode onset: 2 days. The maximum temperature recorded prior to his arrival was 103 - 104 F. The fever has been present for 1 to 2 days. The pain is moderate. Associated symptoms include congestion, coughing, diaphoresis and headaches. Pertinent negatives include no ear pain, shortness of breath or sore throat. (Myalgia) Past treatments include acetaminophen (dayquil, nyquil). The treatment provided no relief.       Review of Systems   Constitutional: Positive for activity change, appetite change, diaphoresis, fatigue and fever. Negative for unexpected weight change.   HENT: Positive for congestion. Negative for ear pain and sore throat.    Eyes: Negative.  Negative for pain and visual disturbance.   Respiratory: Positive for cough. Negative for shortness of breath.    Cardiovascular: Negative for chest pain and palpitations.   Gastrointestinal: Negative for abdominal pain, diarrhea, nausea and vomiting.   Genitourinary: Negative for dysuria and frequency.   Musculoskeletal: Positive for myalgias. Negative for arthralgias.   Skin: Negative for color change and rash.   Neurological: Positive for headaches. Negative for dizziness.    Psychiatric/Behavioral: Negative for sleep disturbance. The patient is not nervous/anxious.        Vitals:    11/27/20 1100   Temp: (!) 103 °F (39.4 °C)       Objective:     No current facility-administered medications for this visit.      No current outpatient medications on file.     Facility-Administered Medications Ordered in Other Visits   Medication Dose Route Frequency Provider Last Rate Last Dose    acetaminophen tablet 650 mg  650 mg Oral Q4H PRN Lissett Oliver MD   650 mg at 12/16/20 0129    acetaminophen tablet 650 mg  650 mg Oral Q8H PRN Lissett Oliver MD   650 mg at 12/14/20 2043    albuterol inhaler 2 puff  2 puff Inhalation Q6H PRN Lissett Oliver MD   2 puff at 12/04/20 2203    arformoteroL nebulizer solution 15 mcg  15 mcg Nebulization BID Aracelis Faulkner MD   15 mcg at 12/16/20 0725    ascorbic acid (vitamin C) tablet 500 mg  500 mg Oral BID Lissett Oliver MD   500 mg at 12/16/20 0813    benzonatate capsule 100 mg  100 mg Oral TID PRN Lissett Oliver MD        bisacodyL suppository 10 mg  10 mg Rectal Once Jennifer Thakur MD        budesonide nebulizer solution 0.5 mg  0.5 mg Nebulization BID Osiel Todd MD   0.5 mg at 12/16/20 0725    carboxymethylcellulose sodium 1 % DpGe   Both Eyes Q8H Osiel Todd MD   9 drop at 12/16/20 1318    cisatracurium (NIMBEX) 200 mg in dextrose 5 % 100 mL infusion  0-10 mcg/kg/min Intravenous Continuous Osiel Todd MD 11.2 mL/hr at 12/16/20 1521 3 mcg/kg/min at 12/16/20 1521    dexamethasone injection 6 mg  6 mg Intravenous Q24H Aracelis Faulkner MD   6 mg at 12/16/20 0814    dexmedetomidine (PRECEDEX) 200 mcg/50 mL infusion  0-1.4 mcg/kg/hr Intravenous Continuous Lissett Oliver MD   Stopped at 12/09/20 1100    dextrose 50% injection 12.5 g  12.5 g Intravenous PRN Lissett Oliver MD        dextrose 50% injection 25 g  25 g Intravenous PRN Lissett Oliver MD        enoxaparin injection 80 mg  80 mg Subcutaneous  Q12H Kaylah Still MD   80 mg at 12/16/20 1226    fentaNYL 2500 mcg in 0.9% sodium chloride 250 mL infusion premix (titrating)  0-250 mcg/hr Intravenous Continuous Osiel Todd MD 25 mL/hr at 12/16/20 0833 250 mcg/hr at 12/16/20 0833    glucagon (human recombinant) injection 1 mg  1 mg Intramuscular PRN Lissett Oliver MD        glucose chewable tablet 16 g  16 g Oral PRN Lissett Oliver MD        glucose chewable tablet 24 g  24 g Oral PRN Lissett Oliver MD        HYDROcodone-acetaminophen 5-325 mg per tablet 1 tablet  1 tablet Oral Q6H PRN Lissett Oliver MD        insulin aspart U-100 pen 1-10 Units  1-10 Units Subcutaneous QID (AC + HS) PRN Lissett Oliver MD   2 Units at 12/13/20 1405    melatonin tablet 6 mg  6 mg Oral Nightly PRN Lissett Oliver MD   6 mg at 12/07/20 2004    metoprolol injection 2.5 mg  2.5 mg Intravenous Q4H PRN Jennifer Thakur MD   2.5 mg at 12/16/20 0142    metoprolol injection 5 mg  5 mg Intravenous Once Lissett Oliver MD        micafungin 100 mg in sodium chloride 0.9 % 100 mL IVPB (ready to mix system)  100 mg Intravenous Q24H Jennifer Thakur  mL/hr at 12/16/20 1217 100 mg at 12/16/20 1217    midazolam (VERSED) 1 mg/mL in dextrose 5 % 50 mL infusion (titrating)  0-5 mg/hr Intravenous Continuous Osiel Todd MD        multivitamin tablet  1 tablet Oral Daily Lissett Oliver MD   1 tablet at 12/16/20 0813    norepinephrine bitartrate-NaCl 8 mg/250 mL (32 mcg/mL) infusion  0-3 mcg/kg/min Intravenous Continuous Osiel Todd MD   Stopped at 12/09/20 1901    ondansetron injection 4 mg  4 mg Intravenous Q8H PRN Lissett Oliver MD   4 mg at 12/02/20 1654    pantoprazole injection 40 mg  40 mg Intravenous BID Jennifer Thakur MD   40 mg at 12/16/20 0813    piperacillin-tazobactam 4.5 g in dextrose 5 % 100 mL IVPB (ready to mix system)  4.5 g Intravenous Q8H Kaylah Still MD 25 mL/hr at 12/16/20 1319 4.5 g at 12/16/20 1319     prochlorperazine injection Soln 5 mg  5 mg Intravenous Q6H PRN Lissett Oliver MD   5 mg at 12/02/20 1840    propofol (DIPRIVAN) 10 mg/mL infusion  0-50 mcg/kg/min Intravenous Continuous Osiel Todd MD 37.4 mL/hr at 12/16/20 1409 50 mcg/kg/min at 12/16/20 1409    rosuvastatin tablet 20 mg  20 mg Oral Daily Lissett Oliver MD   20 mg at 12/16/20 0813    sodium chloride 0.9% flush 10 mL  10 mL Intravenous PRN Lissett Oliver MD        sodium chloride 0.9% flush 10 mL  10 mL Intravenous Q6H Osiel Todd MD   10 mL at 12/16/20 1210    And    sodium chloride 0.9% flush 10 mL  10 mL Intravenous PRN Osiel Todd MD        sodium zirconium cyclosilicate packet 10 g  10 g Oral TID Jean Pierre Carcamo DO   10 g at 12/16/20 1523    vitamin D 1000 units tablet 1,000 Units  1,000 Units Oral Daily Lissett Oliver MD   1,000 Units at 12/16/20 0813    zinc sulfate capsule 220 mg  220 mg Oral Daily Lissett Oliver MD   220 mg at 12/16/20 0813       Physical Exam  Constitutional:       Appearance: He is well-developed. He is ill-appearing.   HENT:      Head: Normocephalic.   Neck:      Musculoskeletal: Normal range of motion.   Pulmonary:      Effort: Pulmonary effort is normal. No respiratory distress.      Comments: Dry cough  Neurological:      Mental Status: He is alert and oriented to person, place, and time.   Psychiatric:         Thought Content: Thought content normal.         Judgment: Judgment normal.         Assessment:       1. Viral syndrome        Plan:   Viral syndrome  -     Influenza A & B by Molecular    Other orders  -     acetaminophen-codeine 300-30mg (TYLENOL #3) 300-30 mg Tab; Take 1 tablet by mouth every 4 (four) hours as needed (fever and pain).  Dispense: 20 tablet; Refill: 0    Covid screen    No follow-ups on file.    There are no Patient Instructions on file for this visit.

## 2020-12-01 PROBLEM — J12.82 PNEUMONIA DUE TO COVID-19 VIRUS: Status: ACTIVE | Noted: 2020-01-01

## 2020-12-01 PROBLEM — U07.1 PNEUMONIA DUE TO COVID-19 VIRUS: Status: ACTIVE | Noted: 2020-01-01

## 2020-12-01 NOTE — TELEPHONE ENCOUNTER
Patient called and is requesting the zpac and inhaler be sent in to the pharmacy. Advised ER if breathing is labored or if patient feels that he is struggling to breath. As of right now Mrs. Gomez patient wife states there is some slight wheezing but breathing is fine and unlabored but if worsens will go to ER.

## 2020-12-01 NOTE — TELEPHONE ENCOUNTER
----- Message from Vanna Rogers sent at 12/1/2020  3:35 PM CST -----  Contact: Patricia Gomez is requesting a call. Please call her back at [phone].      Thanks  DD

## 2020-12-01 NOTE — TELEPHONE ENCOUNTER
Spoke with pt, informed that I sent meds to pharmacy and wanted to discuss BAM infusion. Pt is very short of breath and gasping while he talks. Wife, Patricia, is going to bring him to [facility]

## 2020-12-02 PROBLEM — E87.1 HYPONATREMIA: Status: ACTIVE | Noted: 2020-01-01

## 2020-12-04 NOTE — TELEPHONE ENCOUNTER
----- Message from Celena Stephenson sent at 12/4/2020 12:22 PM CST -----  ..Type:  Patient Returning Call    Who Called: pt   Who Left Message for Patient:  Does the patient know what this is regarding?: f/u  Would the patient rather a call back or a response via [employer]?  Back back   Best Call Back Number:[phone]  Additional Information:Ariela ( pt wife ) is requesting a call from nurse to discuss the pt health and results

## 2020-12-04 NOTE — TELEPHONE ENCOUNTER
Called and spoke with mrs. Titus patient wife. Daughter is planning a trip and she has no taste or smell. However, quarantine period will be up by the time the trip is taking place. I let Mrs. titus know that daughter should be good to go as long as there are no symptoms and no fever without medication after the 10th day.

## 2020-12-05 PROBLEM — R06.02 SHORTNESS OF BREATH: Status: ACTIVE | Noted: 2020-01-01

## 2020-12-05 PROBLEM — J96.01 ACUTE HYPOXEMIC RESPIRATORY FAILURE: Status: ACTIVE | Noted: 2020-01-01

## 2020-12-11 PROBLEM — K29.00 ACUTE SUPERFICIAL GASTRITIS: Status: ACTIVE | Noted: 2020-01-01

## 2020-12-11 PROBLEM — E87.1 HYPONATREMIA: Status: RESOLVED | Noted: 2020-01-01 | Resolved: 2020-01-01

## 2020-12-13 PROBLEM — E87.5 HYPERKALEMIA: Status: ACTIVE | Noted: 2020-01-01

## 2020-12-13 PROBLEM — N17.9 AKI (ACUTE KIDNEY INJURY): Status: ACTIVE | Noted: 2020-01-01

## 2020-12-16 PROBLEM — K13.79 ORAL BLEEDING: Status: ACTIVE | Noted: 2020-01-01

## 2020-12-16 PROBLEM — E87.5 HYPERKALEMIA: Status: RESOLVED | Noted: 2020-01-01 | Resolved: 2020-01-01

## 2021-10-30 NOTE — PROGRESS NOTES
"Subjective:       Patient ID: Bebo Galindo is a 53 y.o. male.    Chief Complaint: Sinus Problem    Sinus Problem   This is a new problem. The current episode started 1 to 4 weeks ago. The problem is unchanged. There has been no fever. The pain is moderate. Associated symptoms include congestion, ear pain, headaches and sinus pressure. Pertinent negatives include no chills, coughing, diaphoresis, hoarse voice, neck pain, shortness of breath, sneezing, sore throat or swollen glands. (Pt also states experiencing left upper jaw "click" which is causing pressure to left ear) Treatments tried: allegra. The treatment provided no relief.     Past Medical History:   Diagnosis Date    Allergy     Hyperlipidemia     Hypertension     Inappropriately high serum insulin 2018    Mitral valve prolapse      Social History     Socioeconomic History    Marital status:      Spouse name: Not on file    Number of children: Not on file    Years of education: Not on file    Highest education level: Not on file   Social Needs    Financial resource strain: Not on file    Food insecurity - worry: Not on file    Food insecurity - inability: Not on file    Transportation needs - medical: Not on file    Transportation needs - non-medical: Not on file   Occupational History    Not on file   Tobacco Use    Smoking status: Former Smoker     Last attempt to quit: 1995     Years since quittin.7    Smokeless tobacco: Never Used   Substance and Sexual Activity    Alcohol use: No    Drug use: No    Sexual activity: Yes     Partners: Female   Other Topics Concern    Not on file   Social History Narrative    Not on file     Past Surgical History:   Procedure Laterality Date    ANAL FISTULOTOMY      CARDIAC CATHETERIZATION  10/2011    no stent    COLONOSCOPY      menicus repair      SINUS SURGERY      VASECTOMY         Review of Systems   Constitutional: Negative.  Negative for chills and " "diaphoresis.   HENT: Positive for congestion, ear pain and sinus pressure. Negative for hoarse voice, sneezing and sore throat.    Eyes: Negative.    Respiratory: Negative.  Negative for cough and shortness of breath.    Cardiovascular: Negative.    Gastrointestinal: Negative.    Endocrine: Negative.    Genitourinary: Negative.    Musculoskeletal: Negative for neck pain.        Left upper jaw "click"   Skin: Negative.    Allergic/Immunologic: Negative.    Neurological: Positive for headaches.   Psychiatric/Behavioral: Negative.        Objective:      Physical Exam   Constitutional: He is oriented to person, place, and time. He appears well-developed and well-nourished.   HENT:   Head: Normocephalic.       Right Ear: Hearing, tympanic membrane, external ear and ear canal normal.   Left Ear: Hearing, external ear and ear canal normal. A middle ear effusion is present.   Nose: Mucosal edema and rhinorrhea present. Right sinus exhibits maxillary sinus tenderness. Right sinus exhibits no frontal sinus tenderness. Left sinus exhibits maxillary sinus tenderness. Left sinus exhibits no frontal sinus tenderness.   Mouth/Throat: Uvula is midline, oropharynx is clear and moist and mucous membranes are normal.   Eyes: Conjunctivae are normal. Pupils are equal, round, and reactive to light.   Neck: Normal range of motion. Neck supple.   Cardiovascular: Normal rate, regular rhythm and normal heart sounds.   Pulmonary/Chest: Effort normal and breath sounds normal.   Abdominal: Soft. Bowel sounds are normal.   Musculoskeletal: Normal range of motion.   Neurological: He is alert and oriented to person, place, and time.   Skin: Skin is warm. Capillary refill takes 2 to 3 seconds.   Psychiatric: He has a normal mood and affect. His behavior is normal. Judgment and thought content normal.   Nursing note and vitals reviewed.      Assessment:       1. Sinusitis, unspecified chronicity, unspecified location    2. Fluid level behind " tympanic membrane of right ear    3. TMJ click        Plan:           Bebo was seen today for sinus problem.    Diagnoses and all orders for this visit:    Sinusitis, unspecified chronicity, unspecified location  Fluid level behind tympanic membrane of right ear  TMJ click  -     dexamethasone injection 8 mg  -     azelastine (ASTELIN) 137 mcg (0.1 %) nasal spray; 1 spray (137 mcg total) by Nasal route 2 (two) times daily. for 7 days  -     doxycycline (VIBRA-TABS) 100 MG tablet; Take 1 tablet (100 mg total) by mouth 2 (two) times daily. for 10 days        Handout r/t TMJ causes, treatment given; discussed        Report to ER immediately if symptoms worsen       Abdominal Pain, N/V/D

## 2024-09-09 NOTE — TELEPHONE ENCOUNTER
----- Message from Krys Castañeda sent at 11/27/2020 10:10 AM CST -----  Regarding: appt  Type:  Same Day Appointment Request    Caller is requesting a same day appointment.  Caller declined first available appointment listed below.    Name of CallerSpouse  When is the first available appointment?01/2021  Symptoms:fever, chills, aches  Best Call Back Number:[phone]  Additional Information: Please call back.thanks      
Video scheduled at 11:40 with Carlos.   
calm